# Patient Record
Sex: MALE | Race: BLACK OR AFRICAN AMERICAN | Employment: FULL TIME | URBAN - METROPOLITAN AREA
[De-identification: names, ages, dates, MRNs, and addresses within clinical notes are randomized per-mention and may not be internally consistent; named-entity substitution may affect disease eponyms.]

---

## 2017-03-28 ENCOUNTER — HOSPITAL ENCOUNTER (EMERGENCY)
Age: 37
Discharge: HOME | End: 2017-03-28
Attending: EMERGENCY MEDICINE
Payer: COMMERCIAL

## 2017-03-28 ENCOUNTER — APPOINTMENT (OUTPATIENT)
Dept: GENERAL RADIOLOGY | Age: 37
End: 2017-03-28
Payer: COMMERCIAL

## 2017-03-28 VITALS
OXYGEN SATURATION: 100 % | HEIGHT: 75 IN | RESPIRATION RATE: 14 BRPM | SYSTOLIC BLOOD PRESSURE: 188 MMHG | WEIGHT: 273.8 LBS | BODY MASS INDEX: 34.04 KG/M2 | DIASTOLIC BLOOD PRESSURE: 106 MMHG | HEART RATE: 90 BPM | TEMPERATURE: 97.8 F

## 2017-03-28 DIAGNOSIS — S93.401D SPRAIN OF RIGHT ANKLE, UNSPECIFIED LIGAMENT, SUBSEQUENT ENCOUNTER: Primary | ICD-10-CM

## 2017-03-28 PROCEDURE — 73630 X-RAY EXAM OF FOOT: CPT

## 2017-03-28 PROCEDURE — 73610 X-RAY EXAM OF ANKLE: CPT

## 2017-03-28 PROCEDURE — 99283 EMERGENCY DEPT VISIT LOW MDM: CPT

## 2017-03-28 RX ORDER — IBUPROFEN 800 MG/1
800 TABLET ORAL EVERY 8 HOURS PRN
Qty: 30 TABLET | Refills: 0 | Status: SHIPPED | OUTPATIENT
Start: 2017-03-28

## 2017-03-28 RX ORDER — HYDROCODONE BITARTRATE AND ACETAMINOPHEN 5; 325 MG/1; MG/1
1 TABLET ORAL 3 TIMES DAILY
Qty: 15 TABLET | Refills: 0 | Status: SHIPPED | OUTPATIENT
Start: 2017-03-28 | End: 2017-04-02

## 2017-03-28 ASSESSMENT — PAIN DESCRIPTION - LOCATION: LOCATION: ANKLE

## 2017-03-28 ASSESSMENT — PAIN SCALES - GENERAL: PAINLEVEL_OUTOF10: 5

## 2017-03-28 ASSESSMENT — PAIN DESCRIPTION - PAIN TYPE: TYPE: CHRONIC PAIN

## 2017-09-29 ENCOUNTER — HOSPITAL ENCOUNTER (EMERGENCY)
Age: 37
Discharge: HOME | End: 2017-09-29
Attending: EMERGENCY MEDICINE
Payer: COMMERCIAL

## 2017-09-29 VITALS
RESPIRATION RATE: 16 BRPM | BODY MASS INDEX: 34.82 KG/M2 | WEIGHT: 280 LBS | HEART RATE: 86 BPM | HEIGHT: 75 IN | TEMPERATURE: 98 F | OXYGEN SATURATION: 98 % | SYSTOLIC BLOOD PRESSURE: 182 MMHG | DIASTOLIC BLOOD PRESSURE: 94 MMHG

## 2017-09-29 DIAGNOSIS — R07.9 CHEST PAIN, UNSPECIFIED TYPE: Primary | ICD-10-CM

## 2017-09-29 DIAGNOSIS — I10 ESSENTIAL HYPERTENSION: ICD-10-CM

## 2017-09-29 LAB
ABSOLUTE EOS #: 0.1 K/UL (ref 0–0.4)
ABSOLUTE LYMPH #: 2.4 K/UL (ref 1–4.8)
ABSOLUTE MONO #: 0.6 K/UL (ref 0.2–0.8)
ANION GAP SERPL CALCULATED.3IONS-SCNC: 16 MMOL/L (ref 9–17)
BASOPHILS # BLD: 0 %
BASOPHILS ABSOLUTE: 0 K/UL (ref 0–0.2)
BUN BLDV-MCNC: 14 MG/DL (ref 6–20)
BUN/CREAT BLD: 10 (ref 9–20)
CALCIUM SERPL-MCNC: 9.4 MG/DL (ref 8.6–10.4)
CHLORIDE BLD-SCNC: 96 MMOL/L (ref 98–107)
CO2: 24 MMOL/L (ref 20–31)
CREAT SERPL-MCNC: 1.4 MG/DL (ref 0.7–1.2)
DIFFERENTIAL TYPE: ABNORMAL
EOSINOPHILS RELATIVE PERCENT: 1 %
GFR AFRICAN AMERICAN: >60 ML/MIN
GFR NON-AFRICAN AMERICAN: 56 ML/MIN
GFR SERPL CREATININE-BSD FRML MDRD: ABNORMAL ML/MIN/{1.73_M2}
GFR SERPL CREATININE-BSD FRML MDRD: ABNORMAL ML/MIN/{1.73_M2}
GLUCOSE BLD-MCNC: 236 MG/DL (ref 75–110)
GLUCOSE BLD-MCNC: 255 MG/DL (ref 70–99)
HCT VFR BLD CALC: 43.3 % (ref 41–53)
HEMOGLOBIN: 14.2 G/DL (ref 13.5–17.5)
LYMPHOCYTES # BLD: 19 %
MCH RBC QN AUTO: 26.8 PG (ref 26–34)
MCHC RBC AUTO-ENTMCNC: 32.8 G/DL (ref 31–37)
MCV RBC AUTO: 81.9 FL (ref 80–100)
MONOCYTES # BLD: 5 %
PDW BLD-RTO: 16.2 % (ref 11.5–14.5)
PLATELET # BLD: 241 K/UL (ref 130–400)
PLATELET ESTIMATE: ABNORMAL
PMV BLD AUTO: 8.9 FL (ref 6–12)
POTASSIUM SERPL-SCNC: 4.2 MMOL/L (ref 3.7–5.3)
RBC # BLD: 5.28 M/UL (ref 4.5–5.9)
RBC # BLD: ABNORMAL 10*6/UL
SEG NEUTROPHILS: 75 %
SEGMENTED NEUTROPHILS ABSOLUTE COUNT: 9.7 K/UL (ref 1.8–7.7)
SODIUM BLD-SCNC: 136 MMOL/L (ref 135–144)
WBC # BLD: 12.9 K/UL (ref 3.5–11)
WBC # BLD: ABNORMAL 10*3/UL

## 2017-09-29 PROCEDURE — 96375 TX/PRO/DX INJ NEW DRUG ADDON: CPT

## 2017-09-29 PROCEDURE — 96374 THER/PROPH/DIAG INJ IV PUSH: CPT

## 2017-09-29 PROCEDURE — 93005 ELECTROCARDIOGRAM TRACING: CPT

## 2017-09-29 PROCEDURE — 36415 COLL VENOUS BLD VENIPUNCTURE: CPT

## 2017-09-29 PROCEDURE — 6360000002 HC RX W HCPCS: Performed by: NURSE PRACTITIONER

## 2017-09-29 PROCEDURE — 99283 EMERGENCY DEPT VISIT LOW MDM: CPT

## 2017-09-29 PROCEDURE — 80048 BASIC METABOLIC PNL TOTAL CA: CPT

## 2017-09-29 PROCEDURE — 96376 TX/PRO/DX INJ SAME DRUG ADON: CPT

## 2017-09-29 PROCEDURE — 82947 ASSAY GLUCOSE BLOOD QUANT: CPT

## 2017-09-29 PROCEDURE — 85025 COMPLETE CBC W/AUTO DIFF WBC: CPT

## 2017-09-29 PROCEDURE — 2500000003 HC RX 250 WO HCPCS: Performed by: NURSE PRACTITIONER

## 2017-09-29 RX ORDER — HYDRALAZINE HYDROCHLORIDE 20 MG/ML
10 INJECTION INTRAMUSCULAR; INTRAVENOUS ONCE
Status: COMPLETED | OUTPATIENT
Start: 2017-09-29 | End: 2017-09-29

## 2017-09-29 RX ORDER — LABETALOL HYDROCHLORIDE 5 MG/ML
10 INJECTION, SOLUTION INTRAVENOUS ONCE
Status: DISCONTINUED | OUTPATIENT
Start: 2017-09-29 | End: 2017-09-29

## 2017-09-29 RX ORDER — AMLODIPINE BESYLATE 10 MG/1
10 TABLET ORAL DAILY
COMMUNITY

## 2017-09-29 RX ORDER — AMLODIPINE BESYLATE 10 MG/1
10 TABLET ORAL DAILY
Qty: 14 TABLET | Refills: 0 | Status: SHIPPED | OUTPATIENT
Start: 2017-09-29

## 2017-09-29 RX ORDER — LISINOPRIL AND HYDROCHLOROTHIAZIDE 20; 12.5 MG/1; MG/1
1 TABLET ORAL 2 TIMES DAILY
Qty: 28 TABLET | Refills: 0 | Status: SHIPPED | OUTPATIENT
Start: 2017-09-29

## 2017-09-29 RX ORDER — METOPROLOL SUCCINATE 25 MG/1
25 TABLET, EXTENDED RELEASE ORAL DAILY
Qty: 14 TABLET | Refills: 0 | Status: SHIPPED | OUTPATIENT
Start: 2017-09-29

## 2017-09-29 RX ORDER — LABETALOL HYDROCHLORIDE 5 MG/ML
20 INJECTION, SOLUTION INTRAVENOUS ONCE
Status: COMPLETED | OUTPATIENT
Start: 2017-09-29 | End: 2017-09-29

## 2017-09-29 RX ADMIN — HYDRALAZINE HYDROCHLORIDE 10 MG: 20 INJECTION INTRAMUSCULAR; INTRAVENOUS at 15:14

## 2017-09-29 RX ADMIN — LABETALOL HYDROCHLORIDE 20 MG: 5 INJECTION, SOLUTION INTRAVENOUS at 17:06

## 2017-09-29 RX ADMIN — HYDRALAZINE HYDROCHLORIDE 10 MG: 20 INJECTION INTRAMUSCULAR; INTRAVENOUS at 16:19

## 2017-09-29 ASSESSMENT — PAIN DESCRIPTION - PAIN TYPE: TYPE: ACUTE PAIN

## 2017-09-29 ASSESSMENT — ENCOUNTER SYMPTOMS
DIARRHEA: 0
COLOR CHANGE: 0
RHINORRHEA: 1
SINUS PRESSURE: 1
SORE THROAT: 0
SHORTNESS OF BREATH: 0
COUGH: 0
VOMITING: 0
ABDOMINAL PAIN: 0
NAUSEA: 0

## 2017-09-29 ASSESSMENT — PAIN SCALES - GENERAL: PAINLEVEL_OUTOF10: 4

## 2017-09-29 ASSESSMENT — PAIN DESCRIPTION - LOCATION: LOCATION: FACE

## 2017-09-29 ASSESSMENT — PAIN DESCRIPTION - DESCRIPTORS: DESCRIPTORS: PRESSURE

## 2017-09-29 NOTE — ED PROVIDER NOTES
Team [address] ED  eMERGENCY dEPARTMENT eNCOUnter      Pt Name: Lois Bernard  MRN: [id number]  [place] 1980  Date of evaluation: 9/29/2017  Provider: Teodoro Downs NP    CHIEF COMPLAINT       Chief Complaint   Patient presents with    Hypertension     sent by (pt has been off of his prescribed medications past several months). .pt given a dose of norvasc at [address]  (Location/Symptom, Timing/Onset, Context/Setting, Quality, Duration, Modifying Factors, Severity.)   Lois Bernard is a 42 y.o. male who presents to the emergency department via private auto for HTN. States he has been out of his medication for about a year. reports he was at an urgent care today to get antibiotics for a sinus infection. He was advised his BP was elevated. He was given 10 mg of norvasc PO without change after one hour. Denies fever, chills, headache, dizziness, vision changes, chest pain, SOB. Rates his pain 4/10 at this time. He was previously on 3 BP medications. Nursing Notes were reviewed. ALLERGIES     Review of patient's allergies indicates no known allergies. CURRENT MEDICATIONS       Previous Medications    AMLODIPINE (NORVASC) 10 MG TABLET    Take 10 mg by mouth daily    IBUPROFEN (ADVIL;MOTRIN) 800 MG TABLET    Take 1 tablet by mouth every 8 hours as needed for Pain    NONFORMULARY    1 tablet daily. Unknown blood pressure medication    ROSUVASTATIN (CRESTOR) 10 MG TABLET    Take 10 mg by mouth daily. SITAGLIPTAN-METFORMIN (JANUMET)  MG PER TABLET    Take 1 tablet by mouth 2 times daily (with meals). PAST MEDICAL HISTORY         Diagnosis Date    Diabetes mellitus ([address]     Hyperlipidemia     Hypertension        SURGICAL HISTORY     History reviewed. No pertinent surgical history. FAMILY HISTORY     History reviewed. No pertinent family history. No family status information on file.         SOCIAL HISTORY      reports that he has never smoked. He has never used smokeless tobacco. He reports that he drinks alcohol. He reports that he does not use illicit drugs. REVIEW OF SYSTEMS    (2-9 systems for level 4, 10 or more for level 5)     Review of Systems   Constitutional: Negative for chills, diaphoresis, fatigue and fever. HENT: Positive for congestion, postnasal drip, rhinorrhea and sinus pressure. Negative for ear discharge, ear pain and sore throat. Respiratory: Negative for cough and shortness of breath. Cardiovascular: Negative for chest pain, palpitations and leg swelling. Gastrointestinal: Negative for abdominal pain, diarrhea, nausea and vomiting. Genitourinary: Negative for dysuria. Musculoskeletal: Negative for arthralgias, myalgias, neck pain and neck stiffness. Skin: Negative for color change and rash. Neurological: Negative for dizziness, weakness, light-headedness and headaches. Except as noted above the remainder of the review of systems was reviewed and negative. PHYSICAL EXAM    (up to 7 for level 4, 8 or more for level 5)   ED Triage Vitals   BP Temp Temp Source Pulse Resp SpO2 Height Weight   09/29/17 1446 09/29/17 1446 09/29/17 1446 09/29/17 1446 09/29/17 1446 09/29/17 1446 09/29/17 1446 09/29/17 1446   254/140 98 °F (36.7 °C) Oral 105 16 99 % 6' 3\" (1.905 m) 280 lb (127 kg)     Physical Exam   Constitutional: He is oriented to person, place, and time. He appears well-developed and well-nourished. No distress. HENT:   Mouth/Throat: Oropharynx is clear and moist.   Eyes: Conjunctivae are normal. Pupils are equal, round, and reactive to light. Cardiovascular: Normal rate, regular rhythm and normal heart sounds. Pulmonary/Chest: Effort normal and breath sounds normal. No respiratory distress. He has no wheezes. He has no rales. Abdominal: Soft. Bowel sounds are normal. There is no tenderness. Musculoskeletal: He exhibits no edema.    Neurological: He is alert and oriented to person, place, and time.   Skin: Skin is warm and dry. No rash noted. He is not diaphoretic. Psychiatric: He has a normal mood and affect. His behavior is normal.   Vitals reviewed. DIAGNOSTIC RESULTS     EKG: All EKG's are interpreted by the Emergency Department Physician who either signs or Co-signs this chart in the absence of a cardiologist.  EKG was interpreted by Dr. Neel Gomez after completion. LABS:  Labs Reviewed   BASIC METABOLIC PANEL - Abnormal; Notable for the following:        Result Value    Glucose 255 (*)     CREATININE 1.40 (*)     Chloride 96 (*)     GFR Non- 56 (*)     All other components within normal limits   CBC WITH AUTO DIFFERENTIAL - Abnormal; Notable for the following:     WBC 12.9 (*)     RDW 16.2 (*)     Segs Absolute 9.70 (*)     All other components within normal limits   POC GLUCOSE FINGERSTICK - Abnormal; Notable for the following:     POC Glucose 236 (*)     All other components within normal limits       All other labs were within normal range or not returned as of this dictation. EMERGENCY DEPARTMENT COURSE and DIFFERENTIAL DIAGNOSIS/MDM:   Vitals:    Vitals:    09/29/17 1619 09/29/17 1657 09/29/17 1722 09/29/17 1736   BP: (!) 230/119 (!) 218/111 (!) 183/96 (!) 182/94   Pulse:  98 87 86   Resp:  18 16 16   Temp:       TempSrc:       SpO2:  97% 97% 98%   Weight:       Height:           MEDICATIONS GIVEN IN THE ED:  Medications   hydrALAZINE (APRESOLINE) injection 10 mg (10 mg Intravenous Given 9/29/17 1514)   hydrALAZINE (APRESOLINE) injection 10 mg (10 mg Intravenous Given 9/29/17 1619)   labetalol (NORMODYNE;TRANDATE) injection 20 mg (20 mg Intravenous Given 9/29/17 1706)       CLINICAL DECISION MAKING:  The patient presented alert with a nontoxic appearance and was seen in conjunction with Dr. Neel Gomez. He was given hydralazine with little improvement. The patient was then given labetalol.  His pharmacy was called ([email or web address] to confirm the prescriptions he was previously on. Prescriptions were written for a two week supply of the 3 medications. Follow up with pcp for further evaluation and treatment, return to ED if condition worsens. FINAL IMPRESSION      1. Chest pain, unspecified type    2.  Essential hypertension          DISPOSITION/PLAN   DISPOSITION Decision to Discharge    PATIENT REFERRED TO:   [place], [address] [facility]  [phone]    Schedule an appointment as soon as possible for a visit in 2 days      [facility] ED  [address] [facility]  [phone]    If symptoms worsen      DISCHARGE MEDICATIONS:     New Prescriptions    AMLODIPINE (NORVASC) 10 MG TABLET    Take 1 tablet by mouth daily    LISINOPRIL-HYDROCHLOROTHIAZIDE (PRINZIDE;ZESTORETIC) 20-12.5 MG PER TABLET    Take 1 tablet by mouth 2 times daily    METOPROLOL SUCCINATE (TOPROL XL) 25 MG EXTENDED RELEASE TABLET    Take 1 tablet by mouth daily           (Please note that portions of this note were completed with a voice recognition program.  Efforts were made to edit the dictations but occasionally words are mis-transcribed.)    REAGAN Pineda NP  09/29/17 2174

## 2017-09-29 NOTE — ED AVS SNAPSHOT
After Visit Summary  (Discharge Instructions)    Medication List for Home    Based on the information you provided to us as well as any changes during this visit, the following is your updated medication list.  Compare this with your prescription bottles at home. If you have any questions or concerns, contact your primary care physician's office. Daily Medication List (This medication list can be shared with any Healthcare provider who is helping you manage your medications)      There are NEW medications for you. START taking them after you leave the hospital     lisinopril-hydrochlorothiazide 20-12.5 MG per tablet   Commonly known as:  PRINZIDE;ZESTORETIC   Take 1 tablet by mouth 2 times daily       metoprolol succinate 25 MG extended release tablet   Commonly known as:  TOPROL XL   Take 1 tablet by mouth daily         You told us you were taking these medications at home, but the amount or how often you take this medication has CHANGED     * amLODIPine 10 MG tablet   Commonly known as:  NORVASC   Take 10 mg by mouth daily   What changed:  Another medication with the same name was added. Make sure you understand how and when to take each. * amLODIPine 10 MG tablet   Commonly known as:  NORVASC   Take 1 tablet by mouth daily   What changed: You were already taking a medication with the same name, and this prescription was added. Make sure you understand how and when to take each. * Notice: This list has 2 medication(s) that are the same as other medications prescribed for you. Read the directions carefully, and ask your doctor or other care provider to review them with you. ASK your doctor about these medications if you have questions     CRESTOR 10 MG tablet   Generic drug:  rosuvastatin   Take 10 mg by mouth daily.        ibuprofen 800 MG tablet   Commonly known as:  ADVIL;MOTRIN   Take 1 tablet by mouth every 8 hours as needed for Pain       JANUMET  MG per tablet Generic drug:  sitaGLIPtan-metformin   Take 1 tablet by mouth 2 times daily (with meals). NONFORMULARY   1 tablet daily. Unknown blood pressure medication            Where to Get Your Medications      You can get these medications from any pharmacy     Bring a paper prescription for each of these medications     amLODIPine 10 MG tablet    lisinopril-hydrochlorothiazide 20-12.5 MG per tablet    metoprolol succinate 25 MG extended release tablet               Allergies as of 2017     No Known Allergies      Immunizations as of 2017     No immunizations on file. After Visit Summary    This summary was created for you. Thank you for entrusting your care to us. The following information includes details about your hospital/visit stay along with steps you should take to help with your recovery once you leave the hospital.  In this packet, you will find information about the topics listed below:    · Instructions about your medications including a list of your home medications  · A summary of your hospital visit  · Follow-up appointments once you have left the hospital  · Your care plan at home      You may receive a survey regarding the care you received during your stay. Your input is valuable to us. We encourage you to complete and return your survey in the envelope provided. We hope you will choose us in the future for your healthcare needs. Patient Information     Patient Name DHRUV Peace 1980      Care Provided at:     Name Address Phone       [place] [address] [phone]            Your Visit    Here you will find information about your visit, including the reason for your visit. Please take this sheet with you when you visit your doctor or other health care provider in the future. It will help determine the best possible medical care for you at that time.   If you have any questions once you leave the hospital, please call the department phone number listed below. Diagnoses this visit     Your diagnoses were CHEST PAIN, UNSPECIFIED TYPE and ESSENTIAL HYPERTENSION. Visit Information     Date of Visit Department Dept Phone    9/29/2017 [facility] [phone]      You were seen by     You were seen by Chelsea Rodriguez MD and Guy Dewey NP. Follow-up Appointments    Below is a list of your follow-up and future appointments. This may not be a complete list as you may have made appointments directly with providers that we are not aware of or your providers may have made some for you. Please call your providers to confirm appointments. It is important to keep your appointments. Please bring your current insurance card, photo ID, co-pay, and all medication bottles to your appointment. If self-pay, payment is expected at the time of service. Follow-up Information     Follow up with Thea James MD. Schedule an appointment as soon as possible for a visit in 2 days. Specialty:  Family Medicine    Contact information:    [address] [facility]  [phone]          Follow up with [facility] ED. Specialty:  Emergency Medicine    Why:  If symptoms worsen    Contact information:    [address] [facility]  [phone]      Preventive Care        Date Due    Diabetic Foot Exam 8/31/1988    Eye Exam By An Eye Doctor 8/31/1988    HIV screening is recommended for all people regardless of risk factors  aged 15-65 years at least once (lifetime) who have never been HIV tested.  8/31/1993    Tetanus Combination Vaccine (1 - Tdap) 8/31/1997    Pneumococcal Vaccine - Pneumovax for adults aged 19-64 years with: chronic heart disease, chronic lung disease, diabetes mellitus, alcoholism, chronic liver disease, or cigarette smoking. (1 of 1 - PPSV23) 8/31/1997    Urine Check For Kidney Problems 2/7/2013 Hemoglobin A1C (Test For Long-Term Glucose Control) 4/28/2015    Cholesterol Screening 4/28/2015    Yearly Flu Vaccine (1) 9/1/2017                 Care Plan Once You Return Home    This section includes instructions you will need to follow once you leave the hospital.  Your care team will discuss these with you, so you and those caring for you know how to best care for your health needs at home. This section may also include educational information about certain health topics that may be of help to you. Important Information if you smoke or are exposed to smoking       SMOKING: QUIT SMOKING. THIS IS THE MOST IMPORTANT ACTION YOU CAN TAKE TO IMPROVE YOUR CURRENT AND FUTURE HEALTH. Call the [address] [place] [address] [employer] at [place] NOW ([phone])    Smoking harms nonsmokers. When nonsmokers are around people who smoke, they absorb nicotine, carbon monoxide, and other ingredients of tobacco smoke. DO NOT SMOKE AROUND CHILDREN     Children exposed to secondhand smoke are at an increased risk of:  Sudden Infant Death Syndrome (SIDS), acute respiratory infections, inflammation of the middle ear, and severe asthma. Over a longer time, it causes heart disease and lung cancer. There is no safe level of exposure to secondhand smoke. [employer] Signup     [employer] allows you to send messages to your doctor, view your test results, renew your prescriptions, schedule appointments, view visit notes, and more. How Do I Sign Up? 1. In your Internet browser, go to [email or web address]  2. Click on the Sign Up Now link in the Sign In box. You will see the New Member Sign Up page. 3. Enter your [employer] Access Code exactly as it appears below. You will not need to use this code after youve completed the sign-up process. If you do not sign up before the expiration date, you must request a new code.   [employer] Access Code: [email or web address] has been given to me, the patient and/or responsible adult. Patient Signature/Responsible Adult: ___________________________________    Nurse Signature: ___________________________________  Resident/MLP Signature: ___________________________________  Attending Signature: ___________________________________    Date:____________Time:____________              Discharge Instructions            High Blood Pressure: Care Instructions  Your Care Instructions  If your blood pressure is usually above 140/90, you have high blood pressure, or hypertension. That means the top number is 140 or higher or the bottom number is 90 or higher, or both. Despite what a lot of people think, high blood pressure usually doesn't cause headaches or make you feel dizzy or lightheaded. It usually has no symptoms. But it does increase your risk for heart attack, stroke, and kidney or eye damage. The higher your blood pressure, the more your risk increases. Your doctor will give you a goal for your blood pressure. Your goal will be based on your health and your age. An example of a goal is to keep your blood pressure below 140/90. Lifestyle changes, such as eating healthy and being active, are always important to help lower blood pressure. You might also take medicine to reach your blood pressure goal.  Follow-up care is a key part of your treatment and safety. Be sure to make and go to all appointments, and call your doctor if you are having problems. It's also a good idea to know your test results and keep a list of the medicines you take. How can you care for yourself at home? Medical treatment  · If you stop taking your medicine, your blood pressure will go back up. You may take one or more types of medicine to lower your blood pressure. Be safe with medicines. Take your medicine exactly as prescribed. Call your doctor if you think you are having a problem with your medicine. · Talk to your doctor before you start taking aspirin every day. Aspirin can help certain people lower their risk of a heart attack or stroke. But taking aspirin isn't right for everyone, because it can cause serious bleeding. · See your doctor regularly. You may need to see the doctor more often at first or until your blood pressure comes down. · If you are taking blood pressure medicine, talk to your doctor before you take decongestants or anti-inflammatory medicine, such as ibuprofen. Some of these medicines can raise blood pressure. · Learn how to check your blood pressure at home. Lifestyle changes  · Stay at a healthy weight. This is especially important if you put on weight around the waist. Losing even 10 pounds can help you lower your blood pressure. · If your doctor recommends it, get more exercise. Walking is a good choice. Bit by bit, increase the amount you walk every day. Try for at least 30 minutes on most days of the week. You also may want to swim, bike, or do other activities. · Avoid or limit alcohol. Talk to your doctor about whether you can drink any alcohol. · Try to limit how much sodium you eat to less than 2,300 milligrams (mg) a day. Your doctor may ask you to try to eat less than 1,500 mg a day. · Eat plenty of fruits (such as bananas and oranges), vegetables, legumes, whole grains, and low-fat dairy products. · Lower the amount of saturated fat in your diet. Saturated fat is found in animal products such as milk, cheese, and meat. Limiting these foods may help you lose weight and also lower your risk for heart disease. · Do not smoke. Smoking increases your risk for heart attack and stroke. If you need help quitting, talk to your doctor about stop-smoking programs and medicines. These can increase your chances of quitting for good. When should you call for help? Call 911 anytime you think you may need emergency care.  This may mean

## 2017-09-30 LAB
EKG ATRIAL RATE: 102 BPM
EKG ATRIAL RATE: 97 BPM
EKG P AXIS: 62 DEGREES
EKG P AXIS: 69 DEGREES
EKG P-R INTERVAL: 158 MS
EKG P-R INTERVAL: 158 MS
EKG Q-T INTERVAL: 390 MS
EKG Q-T INTERVAL: 416 MS
EKG QRS DURATION: 140 MS
EKG QRS DURATION: 144 MS
EKG QTC CALCULATION (BAZETT): 508 MS
EKG QTC CALCULATION (BAZETT): 528 MS
EKG R AXIS: 53 DEGREES
EKG R AXIS: 66 DEGREES
EKG T AXIS: 29 DEGREES
EKG T AXIS: 39 DEGREES
EKG VENTRICULAR RATE: 102 BPM
EKG VENTRICULAR RATE: 97 BPM

## 2017-10-27 ENCOUNTER — HOSPITAL ENCOUNTER (OUTPATIENT)
Age: 37
Setting detail: SPECIMEN
Discharge: HOME | End: 2017-10-27
Payer: COMMERCIAL

## 2017-10-27 LAB
ABSOLUTE EOS #: 0.2 K/UL (ref 0–0.4)
ABSOLUTE IMMATURE GRANULOCYTE: NORMAL K/UL (ref 0–0.3)
ABSOLUTE LYMPH #: 2.5 K/UL (ref 1–4.8)
ABSOLUTE MONO #: 0.5 K/UL (ref 0.1–1.2)
ALBUMIN SERPL-MCNC: 4.1 G/DL (ref 3.5–5.2)
ALBUMIN/GLOBULIN RATIO: 1.1 (ref 1–2.5)
ALP BLD-CCNC: 122 U/L (ref 40–129)
ALT SERPL-CCNC: 19 U/L (ref 5–41)
ANION GAP SERPL CALCULATED.3IONS-SCNC: 16 MMOL/L (ref 9–17)
AST SERPL-CCNC: 37 U/L
BASOPHILS # BLD: 0 %
BASOPHILS ABSOLUTE: 0 K/UL (ref 0–0.2)
BILIRUB SERPL-MCNC: 0.27 MG/DL (ref 0.3–1.2)
BILIRUBIN URINE: NEGATIVE
BUN BLDV-MCNC: 16 MG/DL (ref 6–20)
BUN/CREAT BLD: ABNORMAL (ref 9–20)
CALCIUM SERPL-MCNC: 9 MG/DL (ref 8.6–10.4)
CHLORIDE BLD-SCNC: 94 MMOL/L (ref 98–107)
CHOLESTEROL/HDL RATIO: 9.8
CHOLESTEROL: 332 MG/DL
CO2: 22 MMOL/L (ref 20–31)
COLOR: YELLOW
COMMENT UA: ABNORMAL
CREAT SERPL-MCNC: 1.04 MG/DL (ref 0.7–1.2)
DIFFERENTIAL TYPE: NORMAL
EOSINOPHILS RELATIVE PERCENT: 2 %
ESTIMATED AVERAGE GLUCOSE: 292 MG/DL
GFR AFRICAN AMERICAN: >60 ML/MIN
GFR NON-AFRICAN AMERICAN: >60 ML/MIN
GFR SERPL CREATININE-BSD FRML MDRD: ABNORMAL ML/MIN/{1.73_M2}
GFR SERPL CREATININE-BSD FRML MDRD: ABNORMAL ML/MIN/{1.73_M2}
GLUCOSE BLD-MCNC: 213 MG/DL (ref 70–99)
GLUCOSE URINE: ABNORMAL
HBA1C MFR BLD: 11.8 % (ref 4–6)
HCT VFR BLD CALC: 43 % (ref 41–53)
HDLC SERPL-MCNC: 34 MG/DL
HEMOGLOBIN: 14.1 G/DL (ref 13.5–17.5)
IMMATURE GRANULOCYTES: NORMAL %
KETONES, URINE: NEGATIVE
LDL CHOLESTEROL: 232 MG/DL (ref 0–130)
LEUKOCYTE ESTERASE, URINE: NEGATIVE
LYMPHOCYTES # BLD: 31 %
MCH RBC QN AUTO: 26.8 PG (ref 26–34)
MCHC RBC AUTO-ENTMCNC: 32.9 G/DL (ref 31–37)
MCV RBC AUTO: 81.4 FL (ref 80–100)
MONOCYTES # BLD: 7 %
NITRITE, URINE: NEGATIVE
PDW BLD-RTO: 15.3 % (ref 12.5–15.4)
PH UA: 5 (ref 5–8)
PLATELET # BLD: 238 K/UL (ref 140–450)
PLATELET ESTIMATE: NORMAL
PMV BLD AUTO: 9.5 FL (ref 6–12)
POTASSIUM SERPL-SCNC: 4.7 MMOL/L (ref 3.7–5.3)
PROSTATE SPECIFIC ANTIGEN: 0.52 UG/L
PROTEIN UA: ABNORMAL
RBC # BLD: 5.28 M/UL (ref 4.5–5.9)
RBC # BLD: NORMAL 10*6/UL
SEG NEUTROPHILS: 60 %
SEGMENTED NEUTROPHILS ABSOLUTE COUNT: 4.8 K/UL (ref 1.8–7.7)
SODIUM BLD-SCNC: 132 MMOL/L (ref 135–144)
SPECIFIC GRAVITY UA: 1.03 (ref 1–1.03)
TOTAL PROTEIN: 7.8 G/DL (ref 6.4–8.3)
TRIGL SERPL-MCNC: 330 MG/DL
TSH SERPL DL<=0.05 MIU/L-ACNC: 1.58 MIU/L (ref 0.3–5)
TURBIDITY: CLEAR
URINE HGB: NEGATIVE
UROBILINOGEN, URINE: NORMAL
VITAMIN D 25-HYDROXY: 26 NG/ML (ref 30–100)
VLDLC SERPL CALC-MCNC: ABNORMAL MG/DL (ref 1–30)
WBC # BLD: 8.1 K/UL (ref 3.5–11)
WBC # BLD: NORMAL 10*3/UL

## 2022-09-23 ENCOUNTER — APPOINTMENT (OUTPATIENT)
Dept: GENERAL RADIOLOGY | Age: 42
End: 2022-09-23
Payer: COMMERCIAL

## 2022-09-23 ENCOUNTER — HOSPITAL ENCOUNTER (EMERGENCY)
Age: 42
Discharge: HOME | End: 2022-09-23
Attending: EMERGENCY MEDICINE
Payer: COMMERCIAL

## 2022-09-23 VITALS
DIASTOLIC BLOOD PRESSURE: 80 MMHG | HEART RATE: 97 BPM | WEIGHT: 260 LBS | SYSTOLIC BLOOD PRESSURE: 130 MMHG | TEMPERATURE: 98.1 F | HEIGHT: 75 IN | BODY MASS INDEX: 32.33 KG/M2 | OXYGEN SATURATION: 97 % | RESPIRATION RATE: 18 BRPM

## 2022-09-23 DIAGNOSIS — S91.312A LACERATION OF LEFT FOOT, INITIAL ENCOUNTER: Primary | ICD-10-CM

## 2022-09-23 PROCEDURE — 6370000000 HC RX 637 (ALT 250 FOR IP): Performed by: EMERGENCY MEDICINE

## 2022-09-23 PROCEDURE — 73630 X-RAY EXAM OF FOOT: CPT

## 2022-09-23 PROCEDURE — 6360000002 HC RX W HCPCS: Performed by: EMERGENCY MEDICINE

## 2022-09-23 PROCEDURE — 99284 EMERGENCY DEPT VISIT MOD MDM: CPT

## 2022-09-23 PROCEDURE — 90471 IMMUNIZATION ADMIN: CPT | Performed by: EMERGENCY MEDICINE

## 2022-09-23 PROCEDURE — 90715 TDAP VACCINE 7 YRS/> IM: CPT | Performed by: EMERGENCY MEDICINE

## 2022-09-23 PROCEDURE — 12002 RPR S/N/AX/GEN/TRNK2.6-7.5CM: CPT

## 2022-09-23 RX ORDER — CEPHALEXIN 250 MG/1
500 CAPSULE ORAL ONCE
Status: COMPLETED | OUTPATIENT
Start: 2022-09-23 | End: 2022-09-23

## 2022-09-23 RX ORDER — TRAMADOL HYDROCHLORIDE 50 MG/1
100 TABLET ORAL EVERY 6 HOURS PRN
Qty: 12 TABLET | Refills: 0 | Status: SHIPPED | OUTPATIENT
Start: 2022-09-23 | End: 2022-09-26

## 2022-09-23 RX ORDER — HYDROCODONE BITARTRATE AND ACETAMINOPHEN 5; 325 MG/1; MG/1
1 TABLET ORAL ONCE
Status: COMPLETED | OUTPATIENT
Start: 2022-09-23 | End: 2022-09-23

## 2022-09-23 RX ORDER — CEPHALEXIN 500 MG/1
500 CAPSULE ORAL 4 TIMES DAILY
Qty: 28 CAPSULE | Refills: 0 | Status: SHIPPED | OUTPATIENT
Start: 2022-09-23 | End: 2022-09-30

## 2022-09-23 RX ORDER — LISINOPRIL 5 MG/1
5 TABLET ORAL DAILY
COMMUNITY

## 2022-09-23 RX ORDER — HYDRALAZINE HYDROCHLORIDE 100 MG/1
100 TABLET, FILM COATED ORAL 2 TIMES DAILY
COMMUNITY

## 2022-09-23 RX ADMIN — Medication 3 ML: at 09:55

## 2022-09-23 RX ADMIN — TETANUS TOXOID, REDUCED DIPHTHERIA TOXOID AND ACELLULAR PERTUSSIS VACCINE, ADSORBED 0.5 ML: 5; 2.5; 8; 8; 2.5 SUSPENSION INTRAMUSCULAR at 09:45

## 2022-09-23 RX ADMIN — HYDROCODONE BITARTRATE AND ACETAMINOPHEN 1 TABLET: 5; 325 TABLET ORAL at 10:41

## 2022-09-23 RX ADMIN — CEPHALEXIN 500 MG: 250 CAPSULE ORAL at 09:43

## 2022-09-23 RX ADMIN — HYDROCODONE BITARTRATE AND ACETAMINOPHEN 1 TABLET: 5; 325 TABLET ORAL at 09:43

## 2022-09-23 ASSESSMENT — PAIN DESCRIPTION - ORIENTATION
ORIENTATION: LEFT
ORIENTATION: LEFT

## 2022-09-23 ASSESSMENT — PAIN - FUNCTIONAL ASSESSMENT: PAIN_FUNCTIONAL_ASSESSMENT: 0-10

## 2022-09-23 ASSESSMENT — PAIN SCALES - GENERAL
PAINLEVEL_OUTOF10: 9
PAINLEVEL_OUTOF10: 9
PAINLEVEL_OUTOF10: 8

## 2022-09-23 ASSESSMENT — PAIN DESCRIPTION - LOCATION
LOCATION: FOOT
LOCATION: FOOT

## 2022-09-23 ASSESSMENT — PAIN DESCRIPTION - DESCRIPTORS
DESCRIPTORS: THROBBING
DESCRIPTORS: THROBBING

## 2022-09-23 NOTE — ED PROVIDER NOTES
5 MG tablet Take 5 mg by mouth dailyHistorical Med      !! amLODIPine (NORVASC) 10 MG tablet Take 10 mg by mouth dailyHistorical Med      metoprolol succinate (TOPROL XL) 25 MG extended release tablet Take 1 tablet by mouth daily, Disp-14 tablet, R-0Print      !! amLODIPine (NORVASC) 10 MG tablet Take 1 tablet by mouth daily, Disp-14 tablet, R-0Print      lisinopril-hydrochlorothiazide (PRINZIDE;ZESTORETIC) 20-12.5 MG per tablet Take 1 tablet by mouth 2 times daily, Disp-28 tablet, R-0Print      ibuprofen (ADVIL;MOTRIN) 800 MG tablet Take 1 tablet by mouth every 8 hours as needed for Pain, Disp-30 tablet, R-0Print      sitaGLIPtan-metFORMIN (JANUMET)  MG per tablet Take 1 tablet by mouth 2 times daily (with meals). Historical Med      rosuvastatin (CRESTOR) 10 MG tablet Take 10 mg by mouth daily. !! - Potential duplicate medications found. Please discuss with provider. ALLERGIES     has No Known Allergies. FAMILY HISTORY     has no family status information on file. family history is not on file. SOCIAL HISTORY      reports that he has never smoked. He has never used smokeless tobacco. He reports current alcohol use. He reports that he does not use drugs. PHYSICAL EXAM    (up to 7 for level 4, 8 or more for level 5)   INITIAL VITALS:  height is 6' 3\" (1.905 m) and weight is 117.9 kg (260 lb). His oral temperature is 98.1 °F (36.7 °C). His blood pressure is 130/80 and his pulse is 97. His respiration is 18 and oxygen saturation is 97%. Physical Exam  Vitals and nursing note reviewed. Constitutional:       Appearance: Normal appearance. Musculoskeletal:         General: Tenderness and signs of injury present. Normal range of motion. Skin:     General: Skin is warm and dry. Capillary Refill: Capillary refill takes less than 2 seconds.       Comments: 2.5 cm laceration to the medial aspect of the left foot as depicted below   Neurological:      Mental Status: He is alert and oriented to person, place, and time. Mental status is at baseline. Psychiatric:         Mood and Affect: Mood normal.         Behavior: Behavior normal.         Thought Content: Thought content normal.           DIFFERENTIAL DIAGNOSIS/ MDM:     68-year-old male here with a laceration on his right medial foot from a rock. X-ray shows no broken bones or foreign body. Laceration repaired by WILLIAM Garcia. Tetanus updated. Patient provided with antibiotics as this is a higher risk wound as he is diabetic. DIAGNOSTIC RESULTS     EKG: All EKG's are interpreted by the Emergency Department Physician who either signs or Co-signs this chart in the absence of a cardiologist.    None    RADIOLOGY:        Interpretation per the Radiologist below, if available at the time of this note:    XR FOOT LEFT (MIN 3 VIEWS)    Result Date: 9/23/2022  EXAMINATION: THREE XRAY VIEWS OF THE LEFT FOOT 9/23/2022 9:39 am COMPARISON: None. HISTORY: ORDERING SYSTEM PROVIDED HISTORY: foot pain, struck by a rock, r/o foreign body TECHNOLOGIST PROVIDED HISTORY: foot pain, struck by a rock, r/o foreign body Reason for Exam: foot pain, struck by a rock, r/o foreign body- medial laceration mid left foot FINDINGS: Bandage material is noted about the medial aspect of the hindfoot. No foreign body identified. No acute osseous abnormality appreciated. Soft tissue injury about the medial aspect of the hindfoot without evidence for retained foreign body or fracture. LABS:  No results found for this visit on 09/23/22. None    EMERGENCY DEPARTMENT COURSE:   Vitals:    Vitals:    09/23/22 0936   BP: 130/80   Pulse: 97   Resp: 18   Temp: 98.1 °F (36.7 °C)   TempSrc: Oral   SpO2: 97%   Weight: 117.9 kg (260 lb)   Height: 6' 3\" (1.905 m)     -------------------------  BP: 130/80, Temp: 98.1 °F (36.7 °C), Heart Rate: 97, Resp: 18        CONSULTS:  None    PROCEDURES:  None    FINAL IMPRESSION      1.  Laceration of left foot, initial encounter          DISPOSITION/PLAN   DISPOSITION Decision To Discharge 09/23/2022 11:53:05 AM      CONDITION ON DISPOSITION:   Stable     PATIENT REFERRED TO:  [place], [address] [place] [id number] [phone]    Schedule an appointment as soon as possible for a visit in 3 days      DISCHARGE MEDICATIONS:  Discharge Medication List as of 9/23/2022 11:56 AM        START taking these medications    Details   cephALEXin (KEFLEX) 500 MG capsule Take 1 capsule by mouth 4 times daily for 7 days, Disp-28 capsule, R-0Normal             (Please note that portions of this note were completed with a voicerecognition program.  Efforts were made to edit the dictations but occasionally words are mis-transcribed.)    Maria L Bynum MD  Attending Emergency Medicine Physician        Maria L Bynum MD  09/23/22 3450

## 2022-09-23 NOTE — ED TRIAGE NOTES
Pt was at work and a large rock flung up and hit him in the left foot causing a laceration on top of foot.  Bleeding controled at this time

## 2022-09-23 NOTE — ED PROVIDER NOTES
I was asked to complete the suture repair of this laceration injury, all of the primary evaluation/physical exam/charting/MDM and disposition per attending note. Laceration Repair Procedure Note    Indication: Laceration    Location:   left medial/proximal foot    Procedure: The patient was placed in the appropriate position and anesthesia around the laceration was obtained by infiltration using LET gel. The area around wound(s) was then cleansed with betadine, draped in a sterile fashion and irrigated copiously with normal saline/betadine dilution. The wound(s) were explored well with no foreign bodies or tendon rupture/injury identified. The laceration was closed with 4-0 Prolene using vertical mattress sutures x 6 and a single interrupted suture. There were no additional lacerations requiring repair. The wound area was then dressed with bacitracin and a sterile dressing. Total repaired wound length: 4 cm. Count: Suture count: 7 (vertical mattress x 6, interrupted x 1)    The patient tolerated the procedure well. Patient has good capillary refill. No signs of cyanosis or color change. Some venous ooze appreciated following repair, will dress accordingly. I explained to pt that he will have some drainage/ooze throughout today. I appreciated no retained FB during exploration and copious irrigation. No tendinous  exposures or motor deficits on my exam.  Moving all toes/foot/ankle w/o sensory or motor deficit. Complications: None      [id number]  Pt called and asked for pain meds as foot very painful now at home and resting  Ultram rx provided. Controlled Substance Monitoring:    Acute and Chronic Pain Monitoring:   RX Monitoring 9/23/2022   Periodic Controlled Substance Monitoring No signs of potential drug abuse or diversion identified.             Марина Sun PA-C  09/23/22 [facility]  09/23/22 6220

## 2022-09-29 ENCOUNTER — HOSPITAL ENCOUNTER (OUTPATIENT)
Dept: WOUND CARE | Age: 42
Discharge: HOME | End: 2022-09-29
Payer: COMMERCIAL

## 2022-09-29 VITALS
BODY MASS INDEX: 32.33 KG/M2 | DIASTOLIC BLOOD PRESSURE: 96 MMHG | WEIGHT: 260 LBS | SYSTOLIC BLOOD PRESSURE: 151 MMHG | TEMPERATURE: 97.1 F | RESPIRATION RATE: 18 BRPM | HEIGHT: 75 IN | HEART RATE: 104 BPM

## 2022-09-29 DIAGNOSIS — S90.32XA CONTUSION OF LEFT FOOT, INITIAL ENCOUNTER: ICD-10-CM

## 2022-09-29 DIAGNOSIS — L03.116 CELLULITIS OF LEFT FOOT: ICD-10-CM

## 2022-09-29 DIAGNOSIS — S91.312A LACERATION OF LEFT FOOT, INITIAL ENCOUNTER: Primary | ICD-10-CM

## 2022-09-29 PROCEDURE — 99203 OFFICE O/P NEW LOW 30 MIN: CPT

## 2022-09-29 RX ORDER — LIDOCAINE 50 MG/G
OINTMENT TOPICAL ONCE
Status: CANCELLED | OUTPATIENT
Start: 2022-09-29 | End: 2022-09-29

## 2022-09-29 RX ORDER — BETAMETHASONE DIPROPIONATE 0.05 %
OINTMENT (GRAM) TOPICAL ONCE
Status: CANCELLED | OUTPATIENT
Start: 2022-09-29 | End: 2022-09-29

## 2022-09-29 RX ORDER — INSULIN GLARGINE 100 [IU]/ML
INJECTION, SOLUTION SUBCUTANEOUS
COMMUNITY

## 2022-09-29 RX ORDER — LIDOCAINE HYDROCHLORIDE 40 MG/ML
SOLUTION TOPICAL ONCE
Status: COMPLETED | OUTPATIENT
Start: 2022-09-29 | End: 2022-09-29

## 2022-09-29 RX ORDER — CLOBETASOL PROPIONATE 0.5 MG/G
OINTMENT TOPICAL ONCE
Status: CANCELLED | OUTPATIENT
Start: 2022-09-29 | End: 2022-09-29

## 2022-09-29 RX ORDER — LIDOCAINE HYDROCHLORIDE 20 MG/ML
JELLY TOPICAL ONCE
Status: CANCELLED | OUTPATIENT
Start: 2022-09-29 | End: 2022-09-29

## 2022-09-29 RX ORDER — LIDOCAINE 40 MG/G
CREAM TOPICAL ONCE
Status: CANCELLED | OUTPATIENT
Start: 2022-09-29 | End: 2022-09-29

## 2022-09-29 RX ORDER — GINSENG 100 MG
CAPSULE ORAL ONCE
Status: CANCELLED | OUTPATIENT
Start: 2022-09-29 | End: 2022-09-29

## 2022-09-29 RX ORDER — AMOXICILLIN AND CLAVULANATE POTASSIUM 875; 125 MG/1; MG/1
TABLET, FILM COATED ORAL
COMMUNITY
Start: 2022-09-28

## 2022-09-29 RX ORDER — HYDROCHLOROTHIAZIDE 12.5 MG/1
TABLET ORAL
COMMUNITY

## 2022-09-29 RX ORDER — BACITRACIN ZINC AND POLYMYXIN B SULFATE 500; 1000 [USP'U]/G; [USP'U]/G
OINTMENT TOPICAL ONCE
Status: CANCELLED | OUTPATIENT
Start: 2022-09-29 | End: 2022-09-29

## 2022-09-29 RX ORDER — NIFEDIPINE 90 MG/1
TABLET, EXTENDED RELEASE ORAL
COMMUNITY

## 2022-09-29 RX ORDER — GENTAMICIN SULFATE 1 MG/G
OINTMENT TOPICAL ONCE
Status: CANCELLED | OUTPATIENT
Start: 2022-09-29 | End: 2022-09-29

## 2022-09-29 RX ORDER — BACITRACIN, NEOMYCIN, POLYMYXIN B 400; 3.5; 5 [USP'U]/G; MG/G; [USP'U]/G
OINTMENT TOPICAL ONCE
Status: CANCELLED | OUTPATIENT
Start: 2022-09-29 | End: 2022-09-29

## 2022-09-29 RX ORDER — DOXYCYCLINE HYCLATE 100 MG
100 TABLET ORAL 2 TIMES DAILY
Qty: 14 TABLET | Refills: 0 | Status: SHIPPED | OUTPATIENT
Start: 2022-09-29 | End: 2022-10-05 | Stop reason: ALTCHOICE

## 2022-09-29 RX ORDER — LIDOCAINE HYDROCHLORIDE 40 MG/ML
SOLUTION TOPICAL ONCE
Status: CANCELLED | OUTPATIENT
Start: 2022-09-29 | End: 2022-09-29

## 2022-09-29 RX ADMIN — LIDOCAINE HYDROCHLORIDE 5 ML: 40 SOLUTION TOPICAL at 09:23

## 2022-09-29 ASSESSMENT — PAIN DESCRIPTION - LOCATION: LOCATION: FOOT

## 2022-09-29 ASSESSMENT — PAIN SCALES - GENERAL: PAINLEVEL_OUTOF10: 7

## 2022-09-29 ASSESSMENT — ENCOUNTER SYMPTOMS
DIARRHEA: 0
VOMITING: 0
NAUSEA: 0

## 2022-09-29 ASSESSMENT — PAIN DESCRIPTION - DESCRIPTORS: DESCRIPTORS: ACHING;THROBBING

## 2022-09-29 ASSESSMENT — PAIN DESCRIPTION - ONSET: ONSET: ON-GOING

## 2022-09-29 ASSESSMENT — PAIN - FUNCTIONAL ASSESSMENT: PAIN_FUNCTIONAL_ASSESSMENT: PREVENTS OR INTERFERES SOME ACTIVE ACTIVITIES AND ADLS

## 2022-09-29 ASSESSMENT — PAIN DESCRIPTION - FREQUENCY: FREQUENCY: CONTINUOUS

## 2022-09-29 ASSESSMENT — PAIN DESCRIPTION - ORIENTATION: ORIENTATION: LEFT

## 2022-09-29 NOTE — LETTER
[address]  [address]  Phone: [phone]    Papi Mejia        September 29, 2022     Patient: Yareli Burks   YOB: 1980   Date of Visit: 9/29/2022       To Whom It May Concern: It is my medical opinion that Paolo Thompson should remain out of work until 10/29/22. If you have any questions or concerns, please don't hesitate to call.     Sincerely,        Neri Oliveira DPM

## 2022-09-29 NOTE — PROGRESS NOTES
[address]   Progress Note and Procedure Note      Mercedes Bah RECORD NUMBER:  [id number]  AGE: 38 y.o. GENDER: male  : 1980  EPISODE DATE:  2022    Subjective:     Chief Complaint   Patient presents with    Wound Check     Left foot         HISTORY of PRESENT ILLNESS HPI     Uday Horton is a 38 y.o. male who presents today for wound/ulcer evaluation. History of Wound Context: Patient presents with his wife today for evaluation of left foot injury. He states that last Friday while at work a large rock hit his foot and caused a laceration. He was seen in the emergency room where the laceration was repaired within a few hours. He was discharged on Keflex. He saw occupational health 2 days ago and was changed to Augmentin. States that he did have a fever a few days ago but this has resolved. States that he has had a MRSA infection in the past.    Ulcer Identification:  Ulcer Type: traumatic  Contributing Factors: diabetes          PAST MEDICAL HISTORY        Diagnosis Date    Diabetes mellitus ([facility] [address]     Hyperlipidemia     Hypertension        PAST SURGICAL HISTORY    History reviewed. No pertinent surgical history. FAMILY HISTORY    History reviewed. No pertinent family history.     SOCIAL HISTORY    Social History     Tobacco Use    Smoking status: Never    Smokeless tobacco: Never   Vaping Use    Vaping Use: Never used   Substance Use Topics    Alcohol use: Yes     Comment: ocassional    Drug use: No       ALLERGIES    No Known Allergies    MEDICATIONS    Current Outpatient Medications on File Prior to Encounter   Medication Sig Dispense Refill    amoxicillin-clavulanate (AUGMENTIN) 875-125 MG per tablet       hydroCHLOROthiazide (HYDRODIURIL) 12.5 MG tablet hydrochlorothiazide 12.5 mg tablet      insulin glargine (BASAGLAR KWIKPEN) 100 UNIT/ML injection pen Basaglar KwikPen U-100 Insulin 100 unit/mL (3 mL) subcutaneous   INJECT 60 UNITS UNDER Margins Attached edges 09/29/22 0923   Wound Thickness Description not for Pressure Injury Full thickness 09/29/22 4913   Number of days: 0            Assessment:      Active Hospital Problems    Diagnosis Date Noted    Laceration of left foot [S91.312A] 09/29/2022     Priority: Medium    Cellulitis of left foot [L03.116] 09/29/2022     Priority: Medium    Contusion of left foot [S90.32XA] 09/29/2022     Priority: Medium       Plan:     Treatment Note please see attached Discharge Instructions    Emergency room note and labs reviewed     XR reviewed     Rx doxycycline 100 mg twice daily due to history of MRSA infection     Educated on signs and symptoms of infection. Instructed to call clinic immediately or go to ER if signs and symptoms of infection are present. Silvercel to the incision line daily    Instructed to elevate above the level of the heart as much as possible  Use crutches to     RTC 1 week         PWritten patient discharge instructions given to patient and signed by patient or POA. Orders Placed This Encounter   Medications    doxycycline hyclate (VIBRA-TABS) 100 MG tablet     Sig: Take 1 tablet by mouth 2 times daily for 7 days     Dispense:  14 tablet     Refill:  0     No orders of the defined types were placed in this encounter. Discharge Instructions           [address]      Visit  Discharge Instructions / Physician Orders  DATE: 9/29/22     Home Care: None     SUPPLIES ORDERED THRU:    Workers Comp.                  DATE LAST SUPPLIED      Wound Location:  Left anterior foot     Cleanse with: Liquid antibacterial soap and water, rinse well      Dressing Orders:  Primary dressing    silvercel rope (lay on top of incision line)   Secondary dressing      abd pad, kerlix     secure with   paper tape and ace wrap toe to knee (ace wrap on during the day, off at night)       x 30days     Frequency:  Daily     Additional Orders: Increase protein to diet (meat, cheese, eggs, fish, peanut butter, nuts and beans)  Multivitamin daily     Doxycycline at your pharmacy and continue to take Augmentin    OFFLOADING [x] YES  TYPE:       crutches           [] NA    Weekly wound care visits until determined otherwise. Antibiotic therapy-wound care related YES [x] NO [] NA[]    MY CHART []     Smart Device  []                  Your next appointment with the [facility] is in 1 week                                                                                                   (Please note your next appointment above and if you are unable to keep, kindly give a 24 hour notice. Thank you.)  If more than 15 min late we cannot guarantee you will be seen due to clinician schedule  Per Policy, Excessive cancellation will call for dismissal from program.  If you experience any of the following, please call the [facility] during business hours:  [phone]     * Increase in Pain  * Temperature over 101  * Increase in drainage from your wound  * Drainage with a foul odor  * Bleeding  * Increase in swelling  * Need for compression bandage changes due to slippage, breakthrough drainage. If you need medical attention outside of the business hours of the [facility] please contact your PCP or go to the nearest emergency room. The information contained in the After Visit Summary has been reviewed with me, the patient and/or responsible adult, by my health care provider(s). I had the opportunity to ask questions regarding this information.  I have elected to receive;      []After Visit Summary  [x]Comprehensive Discharge Instruction      Patient signature______________________________________Date:________  Electronically signed by Contreras Buckley RN on 9/29/2022 at 8:52 AM    Electronically signed by Peg Rodriges DPM on 9/29/2022 at 8:47 AM        Electronically signed by Peg Rodriges DPM on 9/29/2022 at 9:47 AM

## 2022-09-29 NOTE — DISCHARGE INSTRUCTIONS
[address]      Visit  Discharge Instructions / Physician Orders  DATE: 9/29/22     Home Care: None     SUPPLIES ORDERED THRU:    Workers Comp. DATE LAST SUPPLIED      Wound Location:  Left anterior foot     Cleanse with: Liquid antibacterial soap and water, rinse well      Dressing Orders:  Primary dressing    silvercel rope (lay on top of incision line)   Secondary dressing      abd pad, kerlix     secure with   paper tape and ace wrap toe to knee (ace wrap on during the day, off at night)       x 30days     Frequency:  Daily     Additional Orders: Increase protein to diet (meat, cheese, eggs, fish, peanut butter, nuts and beans)  Multivitamin daily     Doxycycline at your pharmacy and continue to take Augmentin    OFFLOADING [x] YES  TYPE:       crutches           [] NA    Weekly wound care visits until determined otherwise. Antibiotic therapy-wound care related YES [x] NO [] NA[]    MY CHART []     Smart Device  []                  Your next appointment with the [facility] is in 1 week                                                                                                   (Please note your next appointment above and if you are unable to keep, kindly give a 24 hour notice. Thank you.)  If more than 15 min late we cannot guarantee you will be seen due to clinician schedule  Per Policy, Excessive cancellation will call for dismissal from program.  If you experience any of the following, please call the [facility] during business hours:  [phone]     * Increase in Pain  * Temperature over 101  * Increase in drainage from your wound  * Drainage with a foul odor  * Bleeding  * Increase in swelling  * Need for compression bandage changes due to slippage, breakthrough drainage.      If you need medical attention outside of the business hours of the [email or web address] please contact your PCP or go to the nearest emergency room.     The information contained in the After Visit Summary has been reviewed with me, the patient and/or responsible adult, by my health care provider(s). I had the opportunity to ask questions regarding this information.  I have elected to receive;      []After Visit Summary  [x]Comprehensive Discharge Instruction      Patient signature______________________________________Date:________  Electronically signed by Raudel Duffy RN on 9/29/2022 at 8:52 AM    Electronically signed by Christopher Leiva DPM on 9/29/2022 at 8:47 AM

## 2022-10-03 NOTE — DISCHARGE INSTRUCTIONS
[address]                                 Visit  Discharge Instructions / Physician Orders  DATE: 10/5/22     Home Care: None     SUPPLIES ORDERED THRU:    Workers Comp. DATE LAST SUPPLIED      Wound Location:  Left anterior foot     Cleanse with: Liquid antibacterial soap and water, rinse well      Dressing Orders:  Primary dressing    silvercel rope (lay on top of incision line)   Secondary dressing      abd pad, kerlix     secure with   paper tape and ace wrap toe to knee (ace wrap on during the day, off at night)       x 30days     Frequency:  Daily     Additional Orders: Increase protein to diet (meat, cheese, eggs, fish, peanut butter, nuts and beans)  Multivitamin daily      Doxycycline at your pharmacy and continue to take Augmentin     OFFLOADING [x] YES  TYPE:       crutches           [] NA     Weekly wound care visits until determined otherwise. Antibiotic therapy-wound care related YES [x] NO [] NA[]     MY CHART []     Smart Device  []                  Your next appointment with the [facility] is in 1 week                                                                                                   (Please note your next appointment above and if you are unable to keep, kindly give a 24 hour notice. Thank you.)  If more than 15 min late we cannot guarantee you will be seen due to clinician schedule  Per Policy, Excessive cancellation will call for dismissal from program.  If you experience any of the following, please call the [facility] during business hours:  [phone]     * Increase in Pain  * Temperature over 101  * Increase in drainage from your wound  * Drainage with a foul odor  * Bleeding  * Increase in swelling  * Need for compression bandage changes due to slippage, breakthrough drainage.      If you need medical attention outside of the business hours of the [facility] please contact your PCP or go to the nearest emergency room. The information contained in the After Visit Summary has been reviewed with me, the patient and/or responsible adult, by my health care provider(s). I had the opportunity to ask questions regarding this information.  I have elected to receive;      []After Visit Summary  [x]Comprehensive Discharge Instruction        Patient signature______________________________________Date:________

## 2022-10-05 ENCOUNTER — HOSPITAL ENCOUNTER (OUTPATIENT)
Dept: WOUND CARE | Age: 42
Discharge: HOME | End: 2022-10-05
Payer: COMMERCIAL

## 2022-10-05 VITALS
RESPIRATION RATE: 20 BRPM | DIASTOLIC BLOOD PRESSURE: 74 MMHG | HEART RATE: 94 BPM | SYSTOLIC BLOOD PRESSURE: 149 MMHG | TEMPERATURE: 98.4 F

## 2022-10-05 DIAGNOSIS — S91.312A LACERATION OF LEFT FOOT, INITIAL ENCOUNTER: Primary | ICD-10-CM

## 2022-10-05 PROCEDURE — 99213 OFFICE O/P EST LOW 20 MIN: CPT

## 2022-10-05 RX ORDER — LIDOCAINE HYDROCHLORIDE 40 MG/ML
SOLUTION TOPICAL ONCE
Status: CANCELLED | OUTPATIENT
Start: 2022-10-05 | End: 2022-10-05

## 2022-10-05 RX ORDER — CLOBETASOL PROPIONATE 0.5 MG/G
OINTMENT TOPICAL ONCE
Status: CANCELLED | OUTPATIENT
Start: 2022-10-05 | End: 2022-10-05

## 2022-10-05 RX ORDER — LIDOCAINE 50 MG/G
OINTMENT TOPICAL ONCE
Status: CANCELLED | OUTPATIENT
Start: 2022-10-05 | End: 2022-10-05

## 2022-10-05 RX ORDER — LIDOCAINE HYDROCHLORIDE 40 MG/ML
SOLUTION TOPICAL ONCE
Status: DISCONTINUED | OUTPATIENT
Start: 2022-10-05 | End: 2022-10-06 | Stop reason: HOSPADM

## 2022-10-05 RX ORDER — GENTAMICIN SULFATE 1 MG/G
OINTMENT TOPICAL ONCE
Status: CANCELLED | OUTPATIENT
Start: 2022-10-05 | End: 2022-10-05

## 2022-10-05 RX ORDER — BACITRACIN ZINC AND POLYMYXIN B SULFATE 500; 1000 [USP'U]/G; [USP'U]/G
OINTMENT TOPICAL ONCE
Status: CANCELLED | OUTPATIENT
Start: 2022-10-05 | End: 2022-10-05

## 2022-10-05 RX ORDER — DOXYCYCLINE HYCLATE 100 MG
100 TABLET ORAL 2 TIMES DAILY
Qty: 14 TABLET | Refills: 0 | Status: SHIPPED | OUTPATIENT
Start: 2022-10-05 | End: 2022-10-12

## 2022-10-05 RX ORDER — LIDOCAINE 40 MG/G
CREAM TOPICAL ONCE
Status: CANCELLED | OUTPATIENT
Start: 2022-10-05 | End: 2022-10-05

## 2022-10-05 RX ORDER — LIDOCAINE HYDROCHLORIDE 20 MG/ML
JELLY TOPICAL ONCE
Status: CANCELLED | OUTPATIENT
Start: 2022-10-05 | End: 2022-10-05

## 2022-10-05 RX ORDER — BETAMETHASONE DIPROPIONATE 0.05 %
OINTMENT (GRAM) TOPICAL ONCE
Status: CANCELLED | OUTPATIENT
Start: 2022-10-05 | End: 2022-10-05

## 2022-10-05 RX ORDER — BACITRACIN, NEOMYCIN, POLYMYXIN B 400; 3.5; 5 [USP'U]/G; MG/G; [USP'U]/G
OINTMENT TOPICAL ONCE
Status: CANCELLED | OUTPATIENT
Start: 2022-10-05 | End: 2022-10-05

## 2022-10-05 RX ORDER — GINSENG 100 MG
CAPSULE ORAL ONCE
Status: CANCELLED | OUTPATIENT
Start: 2022-10-05 | End: 2022-10-05

## 2022-10-05 ASSESSMENT — PAIN DESCRIPTION - DESCRIPTORS: DESCRIPTORS: ACHING;THROBBING

## 2022-10-05 ASSESSMENT — ENCOUNTER SYMPTOMS
NAUSEA: 0
VOMITING: 0
DIARRHEA: 0

## 2022-10-05 ASSESSMENT — PAIN SCALES - GENERAL: PAINLEVEL_OUTOF10: 7

## 2022-10-05 ASSESSMENT — PAIN DESCRIPTION - LOCATION: LOCATION: FOOT

## 2022-10-05 ASSESSMENT — PAIN DESCRIPTION - FREQUENCY: FREQUENCY: CONTINUOUS

## 2022-10-05 ASSESSMENT — PAIN DESCRIPTION - ORIENTATION: ORIENTATION: LEFT

## 2022-10-05 NOTE — PROGRESS NOTES
[address]   Progress Note and Procedure Note      Mercedes Bah RECORD NUMBER:  [id number]  AGE: 38 y.o. GENDER: male  : 1980  EPISODE DATE:  10/5/2022    Subjective:     Chief Complaint   Patient presents with    Wound Check     Left lower leg         HISTORY of PRESENT ILLNESS HPI     Gina Jones is a 38 y.o. male who presents today for wound/ulcer evaluation. Interval history: He notes that the redness has been improving slowly. No systemic sign of infection. States he has been using his crutches to stay off of the foot. History of Wound Context: Patient presents with his wife today for evaluation of left foot injury. He states that last Friday while at work a large rock hit his foot and caused a laceration. He was seen in the emergency room where the laceration was repaired within a few hours. He was discharged on Keflex. He saw occupational health 2 days ago and was changed to Augmentin. States that he did have a fever a few days ago but this has resolved. States that he has had a MRSA infection in the past.    Ulcer Identification:  Ulcer Type: traumatic  Contributing Factors: diabetes          PAST MEDICAL HISTORY        Diagnosis Date    Diabetes mellitus ([address]     Hyperlipidemia     Hypertension        PAST SURGICAL HISTORY    No past surgical history on file. FAMILY HISTORY    No family history on file.     SOCIAL HISTORY    Social History     Tobacco Use    Smoking status: Never    Smokeless tobacco: Never   Vaping Use    Vaping Use: Never used   Substance Use Topics    Alcohol use: Yes     Comment: ocassional    Drug use: No       ALLERGIES    No Known Allergies    MEDICATIONS    Current Outpatient Medications on File Prior to Encounter   Medication Sig Dispense Refill    amoxicillin-clavulanate (AUGMENTIN) 875-125 MG per tablet       hydroCHLOROthiazide (HYDRODIURIL) 12.5 MG tablet hydrochlorothiazide 12.5 mg tablet      insulin glargine (BASAGLAR KWIKPEN) 100 UNIT/ML injection pen Basaglar KwikPen U-100 Insulin 100 unit/mL (3 mL) subcutaneous   INJECT 60 UNITS UNDER THE SKIN ONCE DAILY      NIFEdipine (PROCARDIA XL) 90 MG extended release tablet nifedipine ER 90 mg tablet,extended release 24 hr   TAKE 1 TABLET BY MOUTH EVERY DAY FOR 90 DAYS      dapagliflozin (FARXIGA) 10 MG tablet Farxiga 10 mg tablet   TAKE 1 TABLET BY MOUTH EVERY DAY      SITagliptin (JANUVIA) 100 MG tablet Take 100 mg by mouth daily      hydrALAZINE (APRESOLINE) 100 MG tablet Take 100 mg by mouth 2 times daily      lisinopril (PRINIVIL;ZESTRIL) 5 MG tablet Take 5 mg by mouth daily      amLODIPine (NORVASC) 10 MG tablet Take 10 mg by mouth daily (Patient not taking: No sig reported)      metoprolol succinate (TOPROL XL) 25 MG extended release tablet Take 1 tablet by mouth daily (Patient not taking: No sig reported) 14 tablet 0    amLODIPine (NORVASC) 10 MG tablet Take 1 tablet by mouth daily (Patient not taking: No sig reported) 14 tablet 0    lisinopril-hydrochlorothiazide (PRINZIDE;ZESTORETIC) 20-12.5 MG per tablet Take 1 tablet by mouth 2 times daily (Patient not taking: No sig reported) 28 tablet 0    ibuprofen (ADVIL;MOTRIN) 800 MG tablet Take 1 tablet by mouth every 8 hours as needed for Pain 30 tablet 0    sitaGLIPtan-metFORMIN (JANUMET)  MG per tablet Take 1 tablet by mouth 2 times daily (with meals). (Patient not taking: Reported on 9/23/2022)      rosuvastatin (CRESTOR) 10 MG tablet Take 10 mg by mouth daily. No current facility-administered medications on file prior to encounter. REVIEW OF SYSTEMS    Review of Systems   Constitutional:  Negative for chills and fever. Gastrointestinal:  Negative for diarrhea, nausea and vomiting. Skin:  Positive for wound.        Objective:      BP (!) 149/74   Pulse 94   Temp 98.4 °F (36.9 °C) (Tympanic)   Resp 20     Wt Readings from Last 3 Encounters:   09/29/22 260 lb (117.9 kg)   09/23/22 260 lb (117.9 kg)   09/29/17 280 lb (127 kg)       Physical Exam:  General:  Alert and oriented x3. In no acute distress. Lower Extremity Physical Exam:    Vascular: DP pulses are palpable, Bilateral. PT pulses are palpable, Bilateral. CFT <3 seconds to all digits, Bilateral.  Non-pitting edema localized to the left foot and ankle mildly improved from previous. Hair growth is present to the level of the digits, Bilateral.     Neuro: Saph/sural/SP/DP/plantar sensation intact to light touch. Musculoskeletal: EHL/FHL/GS/TA gross motor intact. Gross deformity is absent. Dermatologic: Laceration to the medial left foot near the ankle which is well coapted with Prolene sutures. No gapping upon removal of sutures. Negative probe to bone. There is erythema and ecchymosis at the new-wound. No ascending erythema. Skin temp is equal bilateral.  There is no purulent drainage. There is no fluctuance or crepitus.  Interdigital maceration absent, Bilateral.     Wound 09/29/22 Foot Anterior;Left;Medial wound #1 (Active)   Wound Image   09/29/22 0923   Wound Etiology Traumatic 10/05/22 1321   Dressing Status Old drainage noted 10/05/22 1321   Wound Cleansed Cleansed with saline 10/05/22 1321   Dressing/Treatment Other (comment) 09/29/22 1009   Wound Length (cm) 3 cm 10/05/22 1321   Wound Width (cm) 0.4 cm 10/05/22 1321   Wound Depth (cm) 0.1 cm 10/05/22 1321   Wound Surface Area (cm^2) 1.2 cm^2 10/05/22 1321   Change in Wound Size % (l*w) 58.62 10/05/22 1321   Wound Volume (cm^3) 0.12 cm^3 10/05/22 1321   Wound Healing % 59 10/05/22 1321   Post-Procedure Length (cm) 2.9 cm 09/29/22 0923   Post-Procedure Width (cm) 1 cm 09/29/22 0923   Post-Procedure Depth (cm) 0.1 cm 09/29/22 0923   Post-Procedure Surface Area (cm^2) 2.9 cm^2 09/29/22 0923   Post-Procedure Volume (cm^3) 0.29 cm^3 09/29/22 0923   Wound Assessment Devitalized tissue 10/05/22 1321   Drainage Amount Moderate 10/05/22 1321   Drainage Description Serosanguinous 10/05/22 1321   Odor None 10/05/22 1321   Vidhya-wound Assessment Blanchable erythema 10/05/22 1321   Margins Attached edges 10/05/22 1321   Wound Thickness Description not for Pressure Injury Full thickness 09/29/22 7070   Number of days: 6            Assessment:      Active Hospital Problems    Diagnosis Date Noted    Laceration of left foot [S91.312A] 09/29/2022     Priority: Medium    Cellulitis of left foot [Z05.563] 09/29/2022     Priority: Medium    Contusion of left foot [S90.32XA] 09/29/2022     Priority: Medium       Plan:     Treatment Note please see attached Discharge Instructions    Rx doxycycline 100 mg twice daily for 7 more days due to history of MRSA infection     Educated on signs and symptoms of infection. Instructed to call clinic immediately or go to ER if signs and symptoms of infection are present. Sutures removed and Steri-Strips applied  Silvercel to the incision line daily    Instructed to elevate above the level of the heart as much as possible  Use crutches to remain nonweightbearing to the left foot    RTC 1 week         PWritten patient discharge instructions given to patient and signed by patient or POA.       Orders Placed This Encounter   Medications    lidocaine (XYLOCAINE) 4 % external solution    doxycycline hyclate (VIBRA-TABS) 100 MG tablet     Sig: Take 1 tablet by mouth 2 times daily for 7 days     Dispense:  14 tablet     Refill:  0     Orders Placed This Encounter   Procedures    Initiate Outpatient Wound Care Protocol     Cleanse wound with saline    If wound contains bioburden or contamination cleanse with wound cleanser or antimicrobial solution     For normal periwound tissue without irritation nor maceration, apply topical skin protectant    For periwound tissue with irritation and/or maceration, apply zinc based product, topical steroid cream/ointment, or equivalent     For wounds with dry firm black eschar and/or without exudate, apply betadine and leave open to air      For wounds with scant/small to no exudate or drainage, apply wound gel, hydrocolloid, polymer, or equivalent and cover with secondary dressing/foam      For wounds with moderate/large exudate or drainage, apply alginate, hydrofiber, polymer, or equivalent and cover with secondary dressing/foam    For wounds with nonviable tissue requiring removal, apply chemical or mechanical debrider and cover with secondary dressing/foam    For wounds with tunneling, dead space, or cavity, fill or pack with strip/gauze/kerlex to fit and cover with secondary dressing/foam    For wounds with adequate granulation or epithelization, apply wound gel, hydrocolloid, polymer, collagen, or transparent film, and cover secondary dry dressing/foam    For wounds that need additional secondary dressing to help pad or control additional drainage/exudates, add foam, absorbent pad or hydrocolloid    For wounds with suspected or known infection, apply antimicrobial mesh and/or antimicrobial alginate/hydrofiber, or antimicrobial solution moistened gauze/kerlex, or equivalent and cover with secondary dressing/foam    Compression Management needed for edema control, apply multilayer compression or tubular garment or equivalent    Offloading Management needed for pressure relief, apply offloading shoe/boot or equivalent     Standing Status:   Standing     Number of Occurrences:   1            Discharge Instructions         [address] [place] [facility]                                 Visit  Discharge Instructions / Physician Orders  DATE: 10/5/22     Home Care: None     SUPPLIES ORDERED THRU:    Workers Comp.                  DATE LAST SUPPLIED      Wound Location:  Left anterior foot     Cleanse with: Liquid antibacterial soap and water, rinse well      Dressing Orders:  Primary dressing    silvercel rope (lay on top of incision line)   Secondary dressing      abd pad, kerlix     secure with   paper tape and ace wrap toe to knee (ace wrap on during the day, off at night)       x 30days     Frequency:  Daily     Additional Orders: Increase protein to diet (meat, cheese, eggs, fish, peanut butter, nuts and beans)  Multivitamin daily     OFFLOADING [x] YES  TYPE:       crutches           [] NA     Weekly wound care visits until determined otherwise. Antibiotic therapy-wound care related YES [x] NO [] NA[]     MY CHART []     Smart Device  []                  Your next appointment with the [address] [place] [address] is in 1 week                                                                                                   (Please note your next appointment above and if you are unable to keep, kindly give a 24 hour notice. Thank you.)  If more than 15 min late we cannot guarantee you will be seen due to clinician schedule  Per Policy, Excessive cancellation will call for dismissal from program.  If you experience any of the following, please call the [address] [place] [address] during business hours:  [phone]     * Increase in Pain  * Temperature over 101  * Increase in drainage from your wound  * Drainage with a foul odor  * Bleeding  * Increase in swelling  * Need for compression bandage changes due to slippage, breakthrough drainage. If you need medical attention outside of the business hours of the [address] [place] [address] please contact your PCP or go to the nearest emergency room. The information contained in the After Visit Summary has been reviewed with me, the patient and/or responsible adult, by my health care provider(s). I had the opportunity to ask questions regarding this information.  I have elected to receive;      []After Visit Summary  [x]Comprehensive Discharge Instruction        Patient signature______________________________________Date:________  Electronically signed by Fredrick Latham RN on 10/5/2022 at 1:34 PM   Electronically signed by Shelah Najjar, DPM on 10/5/2022 at 12:54 PM        Electronically signed by Flip Martinez Darlis Cowden, DPM on 10/5/2022 at 1:35 PM

## 2022-10-05 NOTE — DISCHARGE INSTRUCTIONS
[address]                                 Visit  Discharge Instructions / Physician Orders  DATE: 10/5/22     Home Care: None     SUPPLIES ORDERED THRU:    Workers Comp. DATE LAST SUPPLIED      Wound Location:  Left anterior foot     Cleanse with: Liquid antibacterial soap and water, rinse well      Dressing Orders:  Primary dressing    silvercel rope (lay on top of incision line)   Secondary dressing      abd pad, kerlix     secure with   paper tape and ace wrap toe to knee (ace wrap on during the day, off at night)       x 30days     Frequency:  Daily     Additional Orders: Increase protein to diet (meat, cheese, eggs, fish, peanut butter, nuts and beans)  Multivitamin daily     OFFLOADING [x] YES  TYPE:       crutches           [] NA     Weekly wound care visits until determined otherwise. Antibiotic therapy-wound care related YES [x] NO [] NA[]     MY CHART []     Smart Device  []                  Your next appointment with the [facility] is in 1 week                                                                                                   (Please note your next appointment above and if you are unable to keep, kindly give a 24 hour notice. Thank you.)  If more than 15 min late we cannot guarantee you will be seen due to clinician schedule  Per Policy, Excessive cancellation will call for dismissal from program.  If you experience any of the following, please call the [facility] during business hours:  [phone]     * Increase in Pain  * Temperature over 101  * Increase in drainage from your wound  * Drainage with a foul odor  * Bleeding  * Increase in swelling  * Need for compression bandage changes due to slippage, breakthrough drainage. If you need medical attention outside of the business hours of the [address] [place] [address] please contact your PCP or go to the nearest emergency room.      The information contained in the After Visit Summary has been reviewed with me, the patient and/or responsible adult, by my health care provider(s). I had the opportunity to ask questions regarding this information.  I have elected to receive;      []After Visit Summary  [x]Comprehensive Discharge Instruction        Patient signature______________________________________Date:________  Electronically signed by Umair Araya RN on 10/5/2022 at 1:34 PM   Electronically signed by Cely Valera DPM on 10/5/2022 at 12:54 PM

## 2022-10-05 NOTE — PLAN OF CARE
Problem: Chronic Conditions and Co-morbidities  Goal: Patient's chronic conditions and co-morbidity symptoms are monitored and maintained or improved  Outcome: Progressing     Problem: Discharge Planning  Goal: Discharge to home or other facility with appropriate resources  Outcome: Progressing     Problem: Safety - Adult  Goal: Free from fall injury  Outcome: Progressing     Problem: Skin/Tissue Integrity - Adult  Goal: Skin integrity remains intact  Outcome: Progressing  Goal: Incisions, wounds, or drain sites healing without S/S of infection  Outcome: Progressing

## 2022-10-05 NOTE — PROGRESS NOTES
Was able to reach Elias Zhong from [employer] via secured -mail today. She sent claim number and  regarding [place] workers comp case to me. Will attempt to call Workers comp  today.

## 2022-10-06 ENCOUNTER — HOSPITAL ENCOUNTER (OUTPATIENT)
Dept: WOUND CARE | Age: 42
Discharge: HOME | End: 2022-10-06

## 2022-10-06 NOTE — PROGRESS NOTES
Wound Care Supplies      Supply Company:     [employer] [address] [place] [address] St:     [address] [place] [id number]  [address] Dept: [employer] [phone]    Patient Information:      Real Bennett  [address] [place] [id number]   [phone]   : 1980  AGE: 38 y.o. GENDER: male   EPISODE DATE: 10/5/2022    Insurance:      PRIMARY INSURANCE:  Plan:   Coverage:   Effective Date:   Group Number: [unfilled]  Subscriber Number: [id number] - (Worker's Comp)    Payer/Plan Subscr  Sex Relation Sub. Ins. ID Effective Group Num   1. GENERIC MCO -* ADIN MARTIN 1980 Male Self [id number]                                     [address] [place] [address]     C-9 sent to workers comp 10/6/22  Claim # [id number]  Elver Robles from [facility] phone #: [phone] ext.  [phone]    Patient Wound Information:      Problem List Items Addressed This Visit          Other    Laceration of left foot - Primary       WOUNDS REQUIRING DRESSING SUPPLIES:     Wound 22 Foot Anterior;Left;Medial wound #1 (Active)   Wound Image   22 0923   Wound Etiology Traumatic 10/05/22 132   Dressing Status Old drainage noted 10/05/22 1321   Wound Cleansed Cleansed with saline 10/05/22 1321   Dressing/Treatment Other (comment) 22 1009   Wound Length (cm) 3 cm 10/05/22 1321   Wound Width (cm) 0.4 cm 10/05/22 1321   Wound Depth (cm) 0.1 cm 10/05/22 1321   Wound Surface Area (cm^2) 1.2 cm^2 10/05/22 132   Change in Wound Size % (l*w) 58.62 10/05/22 1321   Wound Volume (cm^3) 0.12 cm^3 10/05/22 1321   Wound Healing % 59 10/05/22 1321   Post-Procedure Length (cm) 3 cm 10/05/22 1321   Post-Procedure Width (cm) 0.4 cm 10/05/22 1321   Post-Procedure Depth (cm) 0.1 cm 10/05/22 1321   Post-Procedure Surface Area (cm^2) 1.2 cm^2 10/05/22 1321   Post-Procedure Volume (cm^3) 0.12 cm^3 10/05/22 1321   Wound Assessment Devitalized tissue 10/05/22 1321 Drainage Amount Moderate 10/05/22 1321   Drainage Description Serosanguinous 10/05/22 1321   Odor None 10/05/22 1321   Vidhya-wound Assessment Blanchable erythema 10/05/22 1321   Margins Attached edges 10/05/22 1321   Wound Thickness Description not for Pressure Injury Full thickness 09/29/22 0923   Number of days: 7          Supplies Requested :      WOUND #: 1   PRIMARY DRESSING:  Other: Silvercel   Cover and Secure with: ABD pad, 4 inch kerlix     FREQUENCY OF DRESSING CHANGES:  Daily       ADDITIONAL ITEMS:  [] Gloves Small  [] Gloves Medium [x] Gloves Large [] Gloves XLarge  [] Tape 1\" [x] Tape 2\" [] Tape 3\"  [] Medipore Tape  [] Saline  [] Skin Prep   [] Adhesive Remover   [] Cotton Tip Applicators   [] Other:    Patient Wound(s) Debrided: [] Yes if yes please add date  [x] No    Is the patient currently on an antibiotic for their Wound(s): [x] Yes if yes please add name and dose doxycycline 2x/day [] No    Patient currently being seen by Home Health: [] Yes   [x] No    Duration for needed supplies:  []15  [x]30  []60  []90 Days    Electronically signed by Cele Whitney RN on 10/5/2022 at 4:04 PM     Provider Information:      PROVIDER'S NAME: Ashley Ramirez DPM  [id number]

## 2022-10-07 ENCOUNTER — TELEPHONE (OUTPATIENT)
Dept: WOUND CARE | Age: 42
End: 2022-10-07

## 2022-10-07 NOTE — TELEPHONE ENCOUNTER
Supply order forwarded to [employer]. Hal stating that C9 was sent to [facility]- Adebayo Alex. Spoke with Adebayo Alex From [employer] as she was requesting Work limitation form. Explained to her that This form needs rosio completed by [facility] or [facility]. We will be managing wound care only. Adebayo Alex Verbalized understanding.

## 2022-10-12 ENCOUNTER — HOSPITAL ENCOUNTER (OUTPATIENT)
Dept: WOUND CARE | Age: 42
Discharge: HOME | End: 2022-10-12
Payer: COMMERCIAL

## 2022-10-12 VITALS
RESPIRATION RATE: 20 BRPM | SYSTOLIC BLOOD PRESSURE: 147 MMHG | WEIGHT: 260 LBS | DIASTOLIC BLOOD PRESSURE: 85 MMHG | BODY MASS INDEX: 32.33 KG/M2 | HEART RATE: 97 BPM | HEIGHT: 75 IN | TEMPERATURE: 98 F

## 2022-10-12 DIAGNOSIS — L97.522 ULCER OF LEFT FOOT, WITH FAT LAYER EXPOSED (HCC): ICD-10-CM

## 2022-10-12 DIAGNOSIS — S91.312A LACERATION OF LEFT FOOT, INITIAL ENCOUNTER: Primary | ICD-10-CM

## 2022-10-12 PROCEDURE — 99213 OFFICE O/P EST LOW 20 MIN: CPT

## 2022-10-12 RX ORDER — LIDOCAINE HYDROCHLORIDE 20 MG/ML
JELLY TOPICAL ONCE
Status: CANCELLED | OUTPATIENT
Start: 2022-10-12 | End: 2022-10-12

## 2022-10-12 RX ORDER — BACITRACIN ZINC AND POLYMYXIN B SULFATE 500; 1000 [USP'U]/G; [USP'U]/G
OINTMENT TOPICAL ONCE
Status: CANCELLED | OUTPATIENT
Start: 2022-10-12 | End: 2022-10-12

## 2022-10-12 RX ORDER — LIDOCAINE HYDROCHLORIDE 40 MG/ML
SOLUTION TOPICAL ONCE
Status: COMPLETED | OUTPATIENT
Start: 2022-10-12 | End: 2022-10-12

## 2022-10-12 RX ORDER — BACITRACIN, NEOMYCIN, POLYMYXIN B 400; 3.5; 5 [USP'U]/G; MG/G; [USP'U]/G
OINTMENT TOPICAL ONCE
Status: CANCELLED | OUTPATIENT
Start: 2022-10-12 | End: 2022-10-12

## 2022-10-12 RX ORDER — LIDOCAINE 40 MG/G
CREAM TOPICAL ONCE
Status: CANCELLED | OUTPATIENT
Start: 2022-10-12 | End: 2022-10-12

## 2022-10-12 RX ORDER — GENTAMICIN SULFATE 1 MG/G
OINTMENT TOPICAL ONCE
Status: CANCELLED | OUTPATIENT
Start: 2022-10-12 | End: 2022-10-12

## 2022-10-12 RX ORDER — GINSENG 100 MG
CAPSULE ORAL ONCE
Status: CANCELLED | OUTPATIENT
Start: 2022-10-12 | End: 2022-10-12

## 2022-10-12 RX ORDER — LIDOCAINE HYDROCHLORIDE 40 MG/ML
SOLUTION TOPICAL ONCE
Status: CANCELLED | OUTPATIENT
Start: 2022-10-12 | End: 2022-10-12

## 2022-10-12 RX ORDER — LIDOCAINE 50 MG/G
OINTMENT TOPICAL ONCE
Status: CANCELLED | OUTPATIENT
Start: 2022-10-12 | End: 2022-10-12

## 2022-10-12 RX ORDER — CLOBETASOL PROPIONATE 0.5 MG/G
OINTMENT TOPICAL ONCE
Status: CANCELLED | OUTPATIENT
Start: 2022-10-12 | End: 2022-10-12

## 2022-10-12 RX ORDER — BETAMETHASONE DIPROPIONATE 0.05 %
OINTMENT (GRAM) TOPICAL ONCE
Status: CANCELLED | OUTPATIENT
Start: 2022-10-12 | End: 2022-10-12

## 2022-10-12 RX ADMIN — LIDOCAINE HYDROCHLORIDE 10 ML: 40 SOLUTION TOPICAL at 13:30

## 2022-10-12 ASSESSMENT — PAIN DESCRIPTION - ONSET: ONSET: ON-GOING

## 2022-10-12 ASSESSMENT — ENCOUNTER SYMPTOMS
VOMITING: 0
NAUSEA: 0
DIARRHEA: 0

## 2022-10-12 ASSESSMENT — PAIN DESCRIPTION - LOCATION: LOCATION: FOOT

## 2022-10-12 ASSESSMENT — PAIN SCALES - GENERAL: PAINLEVEL_OUTOF10: 5

## 2022-10-12 ASSESSMENT — PAIN DESCRIPTION - ORIENTATION: ORIENTATION: LEFT

## 2022-10-12 ASSESSMENT — PAIN DESCRIPTION - FREQUENCY: FREQUENCY: INTERMITTENT

## 2022-10-12 ASSESSMENT — PAIN DESCRIPTION - PAIN TYPE: TYPE: CHRONIC PAIN

## 2022-10-12 NOTE — DISCHARGE INSTRUCTIONS
[address]                                 Visit  Discharge Instructions / Physician Orders  DATE: 10/12/22     Home Care: None     SUPPLIES ORDERED THRU:    Workers Comp. DATE LAST SUPPLIED      Wound Location:  Left anterior foot     Cleanse with: Liquid antibacterial soap and water, rinse well      Dressing Orders:  Primary dressing    silvercel rope (lay on top)  Secondary dressing      abd pad, kerlix     secure with   paper tape and ace wrap toe to knee (ace wrap on during the day, off at night)       x 30days     Frequency:  Daily     Additional Orders: Increase protein to diet (meat, cheese, eggs, fish, peanut butter, nuts and beans)  Multivitamin daily     SCHEDULE MRI ONCE APPROVED BY WORKERS COMP     OFFLOADING [x] YES  TYPE:       crutches           [] NA     Weekly wound care visits until determined otherwise. Antibiotic therapy-wound care related YES [x] NO [] NA[]     MY CHART []     Smart Device  []                  [facility] week                                                                                                   (Please note your next appointment above and if you are unable to keep, kindly give a 24 hour notice. Thank you.)  If more than 15 min late we cannot guarantee you will be seen due to clinician schedule  Per Policy, Excessive cancellation will call for dismissal from program.  If you experience any of the following, please call the [facility] during business hours:  [phone]     * Increase in Pain  * Temperature over 101  * Increase in drainage from your wound  * Drainage with a foul odor  * Bleeding  * Increase in swelling  * Need for compression bandage changes due to slippage, breakthrough drainage. If you need medical attention outside of the business hours of the [facility] please contact your PCP or go to the nearest emergency room.      The information contained in the After Visit Summary has been reviewed with me, the patient and/or responsible adult, by my health care provider(s). I had the opportunity to ask questions regarding this information.  I have elected to receive;      []After Visit Summary  [x]Comprehensive Discharge Instruction        Patient signature______________________________________Date:________  Electronically signed by Lizz Hubbard RN on 10/12/2022 at 2:09 PM   Electronically signed by Erie Brunner, DPM on 10/12/2022 at 1:08 PM

## 2022-10-12 NOTE — PLAN OF CARE
Problem: Chronic Conditions and Co-morbidities  Goal: Patient's chronic conditions and co-morbidity symptoms are monitored and maintained or improved  Outcome: Progressing     Problem: Discharge Planning  Goal: Discharge to home or other facility with appropriate resources  Outcome: Progressing     Problem: Pain  Goal: Verbalizes/displays adequate comfort level or baseline comfort level  Outcome: Progressing     Problem: Skin/Tissue Integrity - Adult  Goal: Skin integrity remains intact  Outcome: Progressing  Goal: Incisions, wounds, or drain sites healing without S/S of infection  Outcome: Progressing

## 2022-10-12 NOTE — PROGRESS NOTES
[address]   Progress Note and Procedure Note      Mercedes Scott 43 RECORD NUMBER:  [id number]  AGE: 38 y.o. GENDER: male  : 1980  EPISODE DATE:  10/12/2022    Subjective:     Chief Complaint   Patient presents with    Wound Check     Left dorsal medial foot         HISTORY of PRESENT ILLNESS HPI     Rodrigue Subramanian is a 38 y.o. male who presents today for wound/ulcer evaluation. Interval history: He notes that the redness has been improving slowly. No systemic sign of infection. States he has been using his crutches to stay off of the foot. Continues to have some difficulty in dorsiflexion of the ankle and a feeling of tightness. History of Wound Context: Patient presents with his wife today for evaluation of left foot injury. He states that last Friday while at work a large rock hit his foot and caused a laceration. He was seen in the emergency room where the laceration was repaired within a few hours. He was discharged on Keflex. He saw occupational health 2 days ago and was changed to Augmentin. States that he did have a fever a few days ago but this has resolved. States that he has had a MRSA infection in the past.    Ulcer Identification:  Ulcer Type: traumatic  Contributing Factors: diabetes          PAST MEDICAL HISTORY        Diagnosis Date    Diabetes mellitus ([address]     Hyperlipidemia     Hypertension        PAST SURGICAL HISTORY    History reviewed. No pertinent surgical history. FAMILY HISTORY    History reviewed. No pertinent family history.     SOCIAL HISTORY    Social History     Tobacco Use    Smoking status: Never    Smokeless tobacco: Never   Vaping Use    Vaping Use: Never used   Substance Use Topics    Alcohol use: Yes     Comment: ocassional    Drug use: No       ALLERGIES    No Known Allergies    MEDICATIONS    Current Outpatient Medications on File Prior to Encounter   Medication Sig Dispense Refill    doxycycline hyclate (VIBRA-TABS) 100 MG tablet Take 1 tablet by mouth 2 times daily for 7 days 14 tablet 0    amoxicillin-clavulanate (AUGMENTIN) 875-125 MG per tablet       hydroCHLOROthiazide (HYDRODIURIL) 12.5 MG tablet hydrochlorothiazide 12.5 mg tablet      insulin glargine (BASAGLAR KWIKPEN) 100 UNIT/ML injection pen Basaglar KwikPen U-100 Insulin 100 unit/mL (3 mL) subcutaneous   INJECT 60 UNITS UNDER THE SKIN ONCE DAILY      NIFEdipine (PROCARDIA XL) 90 MG extended release tablet nifedipine ER 90 mg tablet,extended release 24 hr   TAKE 1 TABLET BY MOUTH EVERY DAY FOR 90 DAYS      dapagliflozin (FARXIGA) 10 MG tablet Farxiga 10 mg tablet   TAKE 1 TABLET BY MOUTH EVERY DAY      SITagliptin (JANUVIA) 100 MG tablet Take 100 mg by mouth daily      hydrALAZINE (APRESOLINE) 100 MG tablet Take 100 mg by mouth 2 times daily      lisinopril (PRINIVIL;ZESTRIL) 5 MG tablet Take 5 mg by mouth daily      amLODIPine (NORVASC) 10 MG tablet Take 10 mg by mouth daily (Patient not taking: No sig reported)      metoprolol succinate (TOPROL XL) 25 MG extended release tablet Take 1 tablet by mouth daily (Patient not taking: No sig reported) 14 tablet 0    amLODIPine (NORVASC) 10 MG tablet Take 1 tablet by mouth daily (Patient not taking: No sig reported) 14 tablet 0    lisinopril-hydrochlorothiazide (PRINZIDE;ZESTORETIC) 20-12.5 MG per tablet Take 1 tablet by mouth 2 times daily (Patient not taking: No sig reported) 28 tablet 0    ibuprofen (ADVIL;MOTRIN) 800 MG tablet Take 1 tablet by mouth every 8 hours as needed for Pain 30 tablet 0    sitaGLIPtan-metFORMIN (JANUMET)  MG per tablet Take 1 tablet by mouth 2 times daily (with meals). (Patient not taking: Reported on 9/23/2022)      rosuvastatin (CRESTOR) 10 MG tablet Take 10 mg by mouth daily. No current facility-administered medications on file prior to encounter. REVIEW OF SYSTEMS    Review of Systems   Constitutional:  Negative for chills and fever.    Gastrointestinal: Negative for diarrhea, nausea and vomiting. Skin:  Positive for wound. Objective:      BP (!) 147/85   Pulse 97   Temp 98 °F (36.7 °C) (Tympanic)   Resp 20   Ht 6' 3\" (1.905 m)   Wt 260 lb (117.9 kg)   BMI 32.50 kg/m²     Wt Readings from Last 3 Encounters:   10/12/22 260 lb (117.9 kg)   09/29/22 260 lb (117.9 kg)   09/23/22 260 lb (117.9 kg)       Physical Exam:  General:  Alert and oriented x3. In no acute distress. Lower Extremity Physical Exam:    Vascular: DP pulses are palpable, Bilateral. PT pulses are palpable, Bilateral. CFT <3 seconds to all digits, Bilateral.  Non-pitting edema localized to the left foot and ankle mildly improved from previous. Hair growth is present to the level of the digits, Bilateral.     Neuro: Saph/sural/SP/DP/plantar sensation intact to light touch. Musculoskeletal: EHL/FHL/GS/TA gross motor intact. Gross deformity is absent. Strength is mildly decreased in dorsiflexion. Dermatologic: Laceration to the medial left foot near the ankle. Small amount of dehiscence into the dermal level. Negative probe to bone. No ascending erythema. Skin temp is equal bilateral.  There is no purulent drainage. There is no fluctuance or crepitus. Interdigital maceration absent, Bilateral.     Wound 09/29/22 Foot Anterior;Left;Medial wound #1 (Active)   Wound Image   09/29/22 0923   Wound Etiology Traumatic 10/12/22 1326   Dressing Status New drainage noted; Old drainage noted 10/12/22 1326   Wound Cleansed Cleansed with saline 10/12/22 1326   Dressing/Treatment Other (comment) 09/29/22 1009   Wound Length (cm) 3.5 cm 10/12/22 1326   Wound Width (cm) 1 cm 10/12/22 1326   Wound Depth (cm) 0.1 cm 10/12/22 1326   Wound Surface Area (cm^2) 3.5 cm^2 10/12/22 1326   Change in Wound Size % (l*w) -20.69 10/12/22 1326   Wound Volume (cm^3) 0.35 cm^3 10/12/22 1326   Wound Healing % -21 10/12/22 1326   Post-Procedure Length (cm) 3.5 cm 10/12/22 1326   Post-Procedure Width (cm) 1 cm 10/12/22 1326   Post-Procedure Depth (cm) 0.1 cm 10/12/22 1326   Post-Procedure Surface Area (cm^2) 3.5 cm^2 10/12/22 1326   Post-Procedure Volume (cm^3) 0.35 cm^3 10/12/22 1326   Wound Assessment Devitalized tissue 10/12/22 1326   Drainage Amount Moderate 10/12/22 1326   Drainage Description Serosanguinous; Yellow 10/12/22 1326   Odor None 10/12/22 1326   Vidhya-wound Assessment Blanchable erythema 10/12/22 1326   Margins Attached edges 10/12/22 1326   Wound Thickness Description not for Pressure Injury Full thickness 09/29/22 6975   Number of days: 13            Assessment:      Active Hospital Problems    Diagnosis Date Noted    Ulcer of left foot, with fat layer exposed ([facility] [address] [L97.522] 10/12/2022     Priority: Medium    Laceration of left foot [S91.312A] 09/29/2022     Priority: Medium    Contusion of left foot [S90.32XA] 09/29/2022     Priority: Medium       Plan:     Treatment Note please see attached Discharge Instructions    MRI left ankle to rule out laceration of the TA tendon due to persistent pain and weakness in dorsiflexion. Educated on signs and symptoms of infection. Instructed to call clinic immediately or go to ER if signs and symptoms of infection are present. Silvercel to the ulceration daily    Instructed to elevate above the level of the heart as much as possible  Use crutches to remain non-weightbearing to the left foot    RTC 1 week         PWritten patient discharge instructions given to patient and signed by patient or POA.       Orders Placed This Encounter   Medications    lidocaine (XYLOCAINE) 4 % external solution     Orders Placed This Encounter   Procedures    Initiate Outpatient Wound Care Protocol     Cleanse wound with saline    If wound contains bioburden or contamination cleanse with wound cleanser or antimicrobial solution     For normal periwound tissue without irritation nor maceration, apply topical skin protectant    For periwound tissue with irritation and/or maceration, apply zinc based product, topical steroid cream/ointment, or equivalent     For wounds with dry firm black eschar and/or without exudate, apply betadine and leave open to air      For wounds with scant/small to no exudate or drainage, apply wound gel, hydrocolloid, polymer, or equivalent and cover with secondary dressing/foam      For wounds with moderate/large exudate or drainage, apply alginate, hydrofiber, polymer, or equivalent and cover with secondary dressing/foam    For wounds with nonviable tissue requiring removal, apply chemical or mechanical debrider and cover with secondary dressing/foam    For wounds with tunneling, dead space, or cavity, fill or pack with strip/gauze/kerlex to fit and cover with secondary dressing/foam    For wounds with adequate granulation or epithelization, apply wound gel, hydrocolloid, polymer, collagen, or transparent film, and cover secondary dry dressing/foam    For wounds that need additional secondary dressing to help pad or control additional drainage/exudates, add foam, absorbent pad or hydrocolloid    For wounds with suspected or known infection, apply antimicrobial mesh and/or antimicrobial alginate/hydrofiber, or antimicrobial solution moistened gauze/kerlex, or equivalent and cover with secondary dressing/foam    Compression Management needed for edema control, apply multilayer compression or tubular garment or equivalent    Offloading Management needed for pressure relief, apply offloading shoe/boot or equivalent     Standing Status:   Standing     Number of Occurrences:   1    MRI ANKLE LEFT WO CONTRAST     Standing Status:   Standing     Number of Occurrences:   1     Order Specific Question:   Reason for exam:     Answer:   rule out tendon laceration, particularly TA tendon     Order Specific Question:   What is the sedation requirement?      Answer:   None            Discharge Instructions           [address] [place] and [address] [facility] Visit  Discharge Instructions / Physician Orders  DATE: 10/12/22     Home Care: None     SUPPLIES ORDERED THRU:    Workers Comp. DATE LAST SUPPLIED      Wound Location:  Left anterior foot     Cleanse with: Liquid antibacterial soap and water, rinse well      Dressing Orders:  Primary dressing    silvercel rope (lay on top)  Secondary dressing      abd pad, kerlix     secure with   paper tape and ace wrap toe to knee (ace wrap on during the day, off at night)       x 30days     Frequency:  Daily     Additional Orders: Increase protein to diet (meat, cheese, eggs, fish, peanut butter, nuts and beans)  Multivitamin daily     Schedule MRI as soon as possible     OFFLOADING [x] YES  TYPE:       crutches           [] NA     Weekly wound care visits until determined otherwise. Antibiotic therapy-wound care related YES [x] NO [] NA[]     MY CHART []     Smart Device  []                  Your next appointment with the [facility] is in 1 week                                                                                                   (Please note your next appointment above and if you are unable to keep, kindly give a 24 hour notice. Thank you.)  If more than 15 min late we cannot guarantee you will be seen due to clinician schedule  Per Policy, Excessive cancellation will call for dismissal from program.  If you experience any of the following, please call the [facility] during business hours:  [phone]     * Increase in Pain  * Temperature over 101  * Increase in drainage from your wound  * Drainage with a foul odor  * Bleeding  * Increase in swelling  * Need for compression bandage changes due to slippage, breakthrough drainage. If you need medical attention outside of the business hours of the [facility] please contact your PCP or go to the nearest emergency room.      The information contained in the After Visit Summary has been reviewed with me, the patient and/or responsible adult, by my health care provider(s). I had the opportunity to ask questions regarding this information.  I have elected to receive;      []After Visit Summary  [x]Comprehensive Discharge Instruction        Patient signature______________________________________Date:________  Electronically signed by Woody Salcedo RN on 10/12/2022 at 2:09 PM   Electronically signed by Gilford Fines, DPM on 10/12/2022 at 1:08 PM        Electronically signed by Gilford Fines, DPM on 10/12/2022 at 2:14 PM

## 2022-10-17 NOTE — DISCHARGE INSTRUCTIONS
[id number]                                 Visit  Discharge Instructions / Physician Orders  DATE: 10/19/22     Home Care: None     SUPPLIES ORDERED THRU:    Workers Comp. DATE LAST SUPPLIED      Wound Location:  Left anterior foot- healed     Cleanse with: Keep scab dry     Dressing Orders:  silvercel, abd or dry guaze, and ace wrap     Frequency:  Daily     Additional Orders: Increase protein to diet (meat, cheese, eggs, fish, peanut butter, nuts and beans)  Multivitamin daily       OFFLOADING [x] YES  TYPE:       crutches           [] NA     Weekly wound care visits until determined otherwise. Antibiotic therapy-wound care related YES [x] NO [] NA[]     MY CHART []     Smart Device  []                  Your next appointment with the [employer] [facility] is AS NEEDED                                                                                                   (Please note your next appointment above and if you are unable to keep, kindly give a 24 hour notice. Thank you.)  If more than 15 min late we cannot guarantee you will be seen due to clinician schedule  Per Policy, Excessive cancellation will call for dismissal from program.  If you experience any of the following, [facility] during business hours:  [phone]     * Increase in Pain  * Temperature over 101  * Increase in drainage from your wound  * Drainage with a foul odor  * Bleeding  * Increase in swelling  * Need for compression bandage changes due to slippage, breakthrough drainage. If you need medical attention outside of the business hours of the [facility] please contact your PCP or go to the nearest emergency room. The information contained in the After Visit Summary has been reviewed with me, the patient and/or responsible adult, by my health care provider(s). I had the opportunity to ask questions regarding this information.  I have elected to receive;      []After Visit Summary  [x]Comprehensive Discharge Instruction        Patient signature______________________________________Date:________  Electronically signed by Clinton Lewis RN on 10/19/2022 at 2:31 PM   Electronically signed by Petra Olivares DPM on 10/19/2022 at 1:37 PM

## 2022-10-19 ENCOUNTER — HOSPITAL ENCOUNTER (OUTPATIENT)
Dept: MRI IMAGING | Facility: CLINIC | Age: 42
Discharge: HOME | End: 2022-10-21
Payer: COMMERCIAL

## 2022-10-19 ENCOUNTER — HOSPITAL ENCOUNTER (OUTPATIENT)
Dept: WOUND CARE | Age: 42
Discharge: HOME | End: 2022-10-19
Payer: COMMERCIAL

## 2022-10-19 VITALS
DIASTOLIC BLOOD PRESSURE: 98 MMHG | TEMPERATURE: 98.6 F | RESPIRATION RATE: 20 BRPM | HEART RATE: 98 BPM | SYSTOLIC BLOOD PRESSURE: 134 MMHG

## 2022-10-19 DIAGNOSIS — L03.116 CELLULITIS OF LEFT FOOT: ICD-10-CM

## 2022-10-19 DIAGNOSIS — S92.255D: Primary | ICD-10-CM

## 2022-10-19 DIAGNOSIS — S91.312A LACERATION OF LEFT FOOT, INITIAL ENCOUNTER: ICD-10-CM

## 2022-10-19 DIAGNOSIS — S91.012A LACERATION OF LEFT ANKLE, INITIAL ENCOUNTER: ICD-10-CM

## 2022-10-19 DIAGNOSIS — S90.32XD CONTUSION OF LEFT FOOT, SUBSEQUENT ENCOUNTER: ICD-10-CM

## 2022-10-19 PROCEDURE — 73721 MRI JNT OF LWR EXTRE W/O DYE: CPT

## 2022-10-19 PROCEDURE — 99212 OFFICE O/P EST SF 10 MIN: CPT

## 2022-10-19 RX ORDER — CLOBETASOL PROPIONATE 0.5 MG/G
OINTMENT TOPICAL ONCE
Status: CANCELLED | OUTPATIENT
Start: 2022-10-19 | End: 2022-10-19

## 2022-10-19 RX ORDER — BACITRACIN ZINC AND POLYMYXIN B SULFATE 500; 1000 [USP'U]/G; [USP'U]/G
OINTMENT TOPICAL ONCE
Status: CANCELLED | OUTPATIENT
Start: 2022-10-19 | End: 2022-10-19

## 2022-10-19 RX ORDER — LIDOCAINE HYDROCHLORIDE 40 MG/ML
SOLUTION TOPICAL ONCE
Status: COMPLETED | OUTPATIENT
Start: 2022-10-19 | End: 2022-10-19

## 2022-10-19 RX ORDER — LIDOCAINE 40 MG/G
CREAM TOPICAL ONCE
Status: CANCELLED | OUTPATIENT
Start: 2022-10-19 | End: 2022-10-19

## 2022-10-19 RX ORDER — GENTAMICIN SULFATE 1 MG/G
OINTMENT TOPICAL ONCE
Status: CANCELLED | OUTPATIENT
Start: 2022-10-19 | End: 2022-10-19

## 2022-10-19 RX ORDER — BETAMETHASONE DIPROPIONATE 0.05 %
OINTMENT (GRAM) TOPICAL ONCE
Status: CANCELLED | OUTPATIENT
Start: 2022-10-19 | End: 2022-10-19

## 2022-10-19 RX ORDER — LIDOCAINE HYDROCHLORIDE 40 MG/ML
SOLUTION TOPICAL ONCE
Status: CANCELLED | OUTPATIENT
Start: 2022-10-19 | End: 2022-10-19

## 2022-10-19 RX ORDER — BACITRACIN, NEOMYCIN, POLYMYXIN B 400; 3.5; 5 [USP'U]/G; MG/G; [USP'U]/G
OINTMENT TOPICAL ONCE
Status: CANCELLED | OUTPATIENT
Start: 2022-10-19 | End: 2022-10-19

## 2022-10-19 RX ORDER — LIDOCAINE 50 MG/G
OINTMENT TOPICAL ONCE
Status: CANCELLED | OUTPATIENT
Start: 2022-10-19 | End: 2022-10-19

## 2022-10-19 RX ORDER — LIDOCAINE HYDROCHLORIDE 20 MG/ML
JELLY TOPICAL ONCE
Status: CANCELLED | OUTPATIENT
Start: 2022-10-19 | End: 2022-10-19

## 2022-10-19 RX ORDER — GINSENG 100 MG
CAPSULE ORAL ONCE
Status: CANCELLED | OUTPATIENT
Start: 2022-10-19 | End: 2022-10-19

## 2022-10-19 RX ADMIN — LIDOCAINE HYDROCHLORIDE 5 ML: 40 SOLUTION TOPICAL at 14:04

## 2022-10-19 ASSESSMENT — ENCOUNTER SYMPTOMS
NAUSEA: 0
DIARRHEA: 0
VOMITING: 0

## 2022-10-19 NOTE — PROGRESS NOTES
[address]   Progress Note and Procedure Note      Mercedes Scott 43 RECORD NUMBER:  [id number]  AGE: 38 y.o. GENDER: male  : 1980  EPISODE DATE:  10/19/2022    Subjective:     Chief Complaint   Patient presents with    Wound Check     Left foot         HISTORY of PRESENT ILLNESS HPI     Cathleen Rose is a 38 y.o. male who presents today for wound/ulcer evaluation. Current evaluation:  He had the MRI completed which revealed non-displaced fracture of the navicular. He has remained NWB with crutches. Ulceration has closed. No sign or symptom of infection. Interval history: He notes that the redness has been improving slowly. No systemic sign of infection. States he has been using his crutches to stay off of the foot. Continues to have some difficulty in dorsiflexion of the ankle and a feeling of tightness. History of Wound Context: Patient presents with his wife today for evaluation of left foot injury. He states that last Friday while at work a large rock hit his foot and caused a laceration. He was seen in the emergency room where the laceration was repaired within a few hours. He was discharged on Keflex. He saw occupational health 2 days ago and was changed to Augmentin. States that he did have a fever a few days ago but this has resolved. States that he has had a MRSA infection in the past.    Ulcer Identification:  Ulcer Type: traumatic  Contributing Factors: diabetes          PAST MEDICAL HISTORY        Diagnosis Date    Diabetes mellitus ([address]     Hyperlipidemia     Hypertension        PAST SURGICAL HISTORY    No past surgical history on file. FAMILY HISTORY    No family history on file.     SOCIAL HISTORY    Social History     Tobacco Use    Smoking status: Never    Smokeless tobacco: Never   Vaping Use    Vaping Use: Never used   Substance Use Topics    Alcohol use: Yes     Comment: ocassional    Drug use: No       ALLERGIES    No Known Allergies    MEDICATIONS    Current Outpatient Medications on File Prior to Encounter   Medication Sig Dispense Refill    amoxicillin-clavulanate (AUGMENTIN) 875-125 MG per tablet       hydroCHLOROthiazide (HYDRODIURIL) 12.5 MG tablet hydrochlorothiazide 12.5 mg tablet      insulin glargine (BASAGLAR KWIKPEN) 100 UNIT/ML injection pen Basaglar KwikPen U-100 Insulin 100 unit/mL (3 mL) subcutaneous   INJECT 60 UNITS UNDER THE SKIN ONCE DAILY      NIFEdipine (PROCARDIA XL) 90 MG extended release tablet nifedipine ER 90 mg tablet,extended release 24 hr   TAKE 1 TABLET BY MOUTH EVERY DAY FOR 90 DAYS      dapagliflozin (FARXIGA) 10 MG tablet Farxiga 10 mg tablet   TAKE 1 TABLET BY MOUTH EVERY DAY      SITagliptin (JANUVIA) 100 MG tablet Take 100 mg by mouth daily      hydrALAZINE (APRESOLINE) 100 MG tablet Take 100 mg by mouth 2 times daily      lisinopril (PRINIVIL;ZESTRIL) 5 MG tablet Take 5 mg by mouth daily      amLODIPine (NORVASC) 10 MG tablet Take 10 mg by mouth daily (Patient not taking: No sig reported)      metoprolol succinate (TOPROL XL) 25 MG extended release tablet Take 1 tablet by mouth daily (Patient not taking: No sig reported) 14 tablet 0    amLODIPine (NORVASC) 10 MG tablet Take 1 tablet by mouth daily (Patient not taking: No sig reported) 14 tablet 0    lisinopril-hydrochlorothiazide (PRINZIDE;ZESTORETIC) 20-12.5 MG per tablet Take 1 tablet by mouth 2 times daily (Patient not taking: No sig reported) 28 tablet 0    ibuprofen (ADVIL;MOTRIN) 800 MG tablet Take 1 tablet by mouth every 8 hours as needed for Pain 30 tablet 0    sitaGLIPtan-metFORMIN (JANUMET)  MG per tablet Take 1 tablet by mouth 2 times daily (with meals). (Patient not taking: Reported on 9/23/2022)      rosuvastatin (CRESTOR) 10 MG tablet Take 10 mg by mouth daily. No current facility-administered medications on file prior to encounter.        REVIEW OF SYSTEMS    Review of Systems   Constitutional:  Negative for chills and fever. Gastrointestinal:  Negative for diarrhea, nausea and vomiting. Skin:  Positive for wound. Objective:      BP (!) 134/98   Pulse 98   Temp 98.6 °F (37 °C) (Tympanic)   Resp 20     Wt Readings from Last 3 Encounters:   10/19/22 260 lb (117.9 kg)   10/12/22 260 lb (117.9 kg)   09/29/22 260 lb (117.9 kg)       Physical Exam:  General:  Alert and oriented x3. In no acute distress. Lower Extremity Physical Exam:    Vascular: DP pulses are palpable, Bilateral. PT pulses are palpable, Bilateral. CFT <3 seconds to all digits, Bilateral.  Non-pitting edema localized to the left foot and ankle mildly improved from previous. Hair growth is present to the level of the digits, Bilateral.     Neuro: Saph/sural/SP/DP/plantar sensation intact to light touch. Musculoskeletal: EHL/FHL/GS/TA gross motor intact. Gross deformity is absent. Strength is mildly decreased in dorsiflexion. TTP at the level of the navicular. Dermatologic: Ulceration has healed with dry scab. No erythema. Skin temp is equal bilateral.  There is no drainage. There is no fluctuance or crepitus. Interdigital maceration absent, Bilateral.     Wound 09/29/22 Foot Anterior;Left;Medial wound #1 (Active)   Wound Image   10/19/22 1400   Wound Etiology Traumatic 10/19/22 1400   Dressing Status New drainage noted; Old drainage noted 10/19/22 1400   Wound Cleansed Not Cleansed 10/19/22 1400   Dressing/Treatment Other (comment) 10/12/22 1427   Wound Length (cm) 3.2 cm 10/19/22 1400   Wound Width (cm) 1 cm 10/19/22 1400   Wound Depth (cm) 0.1 cm 10/19/22 1400   Wound Surface Area (cm^2) 3.2 cm^2 10/19/22 1400   Change in Wound Size % (l*w) -10.34 10/19/22 1400   Wound Volume (cm^3) 0.32 cm^3 10/19/22 1400   Wound Healing % -10 10/19/22 1400   Post-Procedure Length (cm) 3.5 cm 10/12/22 1326   Post-Procedure Width (cm) 1 cm 10/12/22 1326   Post-Procedure Depth (cm) 0.1 cm 10/12/22 1326   Post-Procedure Surface Area (cm^2) 3.5 cm^2 10/12/22 1326   Post-Procedure Volume (cm^3) 0.35 cm^3 10/12/22 1326   Wound Assessment Eschar dry 10/19/22 1400   Drainage Amount Moderate 10/19/22 1400   Drainage Description Serosanguinous; Yellow 10/19/22 1400   Odor None 10/19/22 1400   Vidhya-wound Assessment Blanchable erythema 10/19/22 1400   Margins Attached edges 10/19/22 1400   Wound Thickness Description not for Pressure Injury Full thickness 09/29/22 0923   Number of days: 20            Assessment:      Active Hospital Problems    Diagnosis Date Noted    Open nondisplaced fracture of navicular bone of left foot with routine healing [S92.255D] 10/19/2022     Priority: Medium    Ulcer of left foot, with fat layer exposed ([address] [L97.522] 10/12/2022     Priority: Medium    Laceration of left foot [S91.312A] 09/29/2022     Priority: Medium    Contusion of left foot [S90.32XA] 09/29/2022     Priority: Medium       Plan:     Treatment Note please see attached Discharge Instructions    MRI left ankle reviewed and discussed with the patient and his significant other. Recommend continued NWB to the left LE with crutches due to non-displaced fracture of the navicular. May require cast or CAM boot for immobilization. Referral placed to Dr. Sher Driver. Instructed to elevate above the level of the heart as much as possible  Use crutches to remain non-weightbearing to the left foot    RTC 1 week         PWritten patient discharge instructions given to patient and signed by patient or POA.       Orders Placed This Encounter   Medications    lidocaine (XYLOCAINE) 4 % external solution     Orders Placed This Encounter   Procedures    Initiate Outpatient Wound Care Protocol     Cleanse wound with saline    If wound contains bioburden or contamination cleanse with wound cleanser or antimicrobial solution     For normal periwound tissue without irritation nor maceration, apply topical skin protectant    For periwound tissue with irritation and/or maceration, apply zinc based product, topical steroid cream/ointment, or equivalent     For wounds with dry firm black eschar and/or without exudate, apply betadine and leave open to air      For wounds with scant/small to no exudate or drainage, apply wound gel, hydrocolloid, polymer, or equivalent and cover with secondary dressing/foam      For wounds with moderate/large exudate or drainage, apply alginate, hydrofiber, polymer, or equivalent and cover with secondary dressing/foam    For wounds with nonviable tissue requiring removal, apply chemical or mechanical debrider and cover with secondary dressing/foam    For wounds with tunneling, dead space, or cavity, fill or pack with strip/gauze/kerlex to fit and cover with secondary dressing/foam    For wounds with adequate granulation or epithelization, apply wound gel, hydrocolloid, polymer, collagen, or transparent film, and cover secondary dry dressing/foam    For wounds that need additional secondary dressing to help pad or control additional drainage/exudates, add foam, absorbent pad or hydrocolloid    For wounds with suspected or known infection, apply antimicrobial mesh and/or antimicrobial alginate/hydrofiber, or antimicrobial solution moistened gauze/kerlex, or equivalent and cover with secondary dressing/foam    Compression Management needed for edema control, apply multilayer compression or tubular garment or equivalent    Offloading Management needed for pressure relief, apply offloading shoe/boot or equivalent     Standing Status:   Standing     Number of Occurrences:   1            Discharge Instructions         [address] and [address] [facility]                                 Visit  Discharge Instructions / Physician Orders  DATE: 10/19/22     Home Care: None     SUPPLIES ORDERED THRU:    Workers Comp.                  DATE LAST SUPPLIED      Wound Location:  Left anterior foot- healed     Cleanse with: Keep scab dry     Dressing Orders:  silvercel, abd or dry guaze, and ace wrap     Frequency:  Daily     Additional Orders: Increase protein to diet (meat, cheese, eggs, fish, peanut butter, nuts and beans)  Multivitamin daily       OFFLOADING [x] YES  TYPE:       crutches           [] NA     Weekly wound care visits until determined otherwise. Antibiotic therapy-wound care related YES [x] NO [] NA[]     MY CHART []     Smart Device  []                  Your next appointment with the [facility] is AS NEEDED                                                                                                   (Please note your next appointment above and if you are unable to keep, kindly give a 24 hour notice. Thank you.)  If more than 15 min late we cannot guarantee you will be seen due to clinician schedule  Per Policy, Excessive cancellation will call for dismissal from program.  If you experience any of the following, please call the [facility] during business hours:  [phone]     * Increase in Pain  * Temperature over 101  * Increase in drainage from your wound  * Drainage with a foul odor  * Bleeding  * Increase in swelling  * Need for compression bandage changes due to slippage, breakthrough drainage. If you need medical attention outside of the business hours of the [facility] please contact your PCP or go to the nearest emergency room. The information contained in the After Visit Summary has been reviewed with me, the patient and/or responsible adult, by my health care provider(s). I had the opportunity to ask questions regarding this information.  I have elected to receive;      []After Visit Summary  [x]Comprehensive Discharge Instruction        Patient signature______________________________________Date:________  Electronically signed by Juan J Puckett DPM on 10/19/2022 at 1:37 PM        Electronically signed by Juan J Puckett DPM on 10/19/2022 at 2:24 PM

## 2022-10-24 ENCOUNTER — OFFICE VISIT (OUTPATIENT)
Dept: PODIATRY | Age: 42
End: 2022-10-24
Payer: COMMERCIAL

## 2022-10-24 VITALS — BODY MASS INDEX: 32.33 KG/M2 | WEIGHT: 260 LBS | HEIGHT: 75 IN

## 2022-10-24 DIAGNOSIS — S91.012A LACERATION OF LEFT ANKLE, INITIAL ENCOUNTER: ICD-10-CM

## 2022-10-24 DIAGNOSIS — S90.32XA CONTUSION OF LEFT FOOT, INITIAL ENCOUNTER: ICD-10-CM

## 2022-10-24 DIAGNOSIS — S92.255D: ICD-10-CM

## 2022-10-24 DIAGNOSIS — S91.312A LACERATION OF LEFT FOOT, INITIAL ENCOUNTER: Primary | ICD-10-CM

## 2022-10-24 DIAGNOSIS — L97.522 ULCER OF LEFT FOOT, WITH FAT LAYER EXPOSED (HCC): ICD-10-CM

## 2022-10-24 DIAGNOSIS — L03.116 CELLULITIS OF LEFT FOOT: ICD-10-CM

## 2022-10-24 PROCEDURE — 25622 CLTX CARPL SCPHD FX W/O MNPJ: CPT | Performed by: PODIATRIST

## 2022-10-24 PROCEDURE — L4361 PNEUMA/VAC WALK BOOT PRE OTS: HCPCS | Performed by: PODIATRIST

## 2022-10-24 PROCEDURE — 99204 OFFICE O/P NEW MOD 45 MIN: CPT | Performed by: PODIATRIST

## 2022-10-24 NOTE — PROGRESS NOTES
[address] [facility] PODIATRY [facility]  [address] [address] [address]  Dept: [phone]  Dept Fax: [phone]    NEW PATIENT PROGRESS NOTE  Date of patient's visit: 10/24/2022  Patient's Name:  Cathy Pastor YOB: 1980            Patient Care Team:  Flip Aviles MD as PCP - General        Chief Complaint   Patient presents with    New Patient    Foot Injury     Left foot injury on 9/23/22 with wound and fracture         HPI:   Cathy Pastor is a 38 y.o. male who presents to the office today complaining of left foot injury. Symptoms began 1 month(s) ago. Patient relates pain is Present. Pain is rated 4 out of 10 and is described as constant, moderate, severe. Treatments prior to today's visit include: wound care for left foot ulcer. Currently denies F/C/N/V. Pt's primary care physician is Flip Aviles MD last seen 09/30/2022     No Known Allergies    Past Medical History:   Diagnosis Date    Diabetes mellitus ([facility]     Hyperlipidemia     Hypertension        Prior to Admission medications    Medication Sig Start Date End Date Taking?  Authorizing Provider   hydroCHLOROthiazide (HYDRODIURIL) 12.5 MG tablet hydrochlorothiazide 12.5 mg tablet   Yes Historical Provider, MD   insulin glargine (BASAGLAR KWIKPEN) 100 UNIT/ML injection pen Basaglar KwikPen U-100 Insulin 100 unit/mL (3 mL) subcutaneous   INJECT 60 UNITS UNDER THE SKIN ONCE DAILY   Yes Historical Provider, MD   NIFEdipine (PROCARDIA XL) 90 MG extended release tablet nifedipine ER 90 mg tablet,extended release 24 hr   TAKE 1 TABLET BY MOUTH EVERY DAY FOR 90 DAYS   Yes Historical Provider, MD   dapagliflozin (FARXIGA) 10 MG tablet Farxiga 10 mg tablet   TAKE 1 TABLET BY MOUTH EVERY DAY   Yes Historical Provider, MD   SITagliptin (JANUVIA) 100 MG tablet Take 100 mg by mouth daily   Yes Historical Provider, MD   hydrALAZINE (APRESOLINE) 100 MG tablet Take 100 mg by mouth 2 times daily   Yes Historical Provider, MD   lisinopril (PRINIVIL;ZESTRIL) 5 MG tablet Take 5 mg by mouth daily   Yes Historical Provider, MD   ibuprofen (ADVIL;MOTRIN) 800 MG tablet Take 1 tablet by mouth every 8 hours as needed for Pain 3/28/17  Yes Katelin Espino PA-C   rosuvastatin (CRESTOR) 10 MG tablet Take 10 mg by mouth daily. Yes Historical Provider, MD   amoxicillin-clavulanate (AUGMENTIN) 875-125 MG per tablet  9/28/22   Historical Provider, MD   amLODIPine (NORVASC) 10 MG tablet Take 10 mg by mouth daily  Patient not taking: No sig reported    Historical Provider, MD   metoprolol succinate (TOPROL XL) 25 MG extended release tablet Take 1 tablet by mouth daily  Patient not taking: No sig reported 9/29/17   KAREN Singh - CNP   amLODIPine (NORVASC) 10 MG tablet Take 1 tablet by mouth daily  Patient not taking: No sig reported 9/29/17   KAREN Singh - CNP   lisinopril-hydrochlorothiazide (PRINZIDE;ZESTORETIC) 20-12.5 MG per tablet Take 1 tablet by mouth 2 times daily  Patient not taking: No sig reported 9/29/17   Neeta Monae APRN - CNP   sitaGLIPtan-metFORMIN (JANUMET)  MG per tablet Take 1 tablet by mouth 2 times daily (with meals). Patient not taking: No sig reported    Historical Provider, MD       No past surgical history on file. No family history on file. Social History     Tobacco Use    Smoking status: Never    Smokeless tobacco: Never   Substance Use Topics    Alcohol use: Yes     Comment: ocassional       Review of Systems    Review of Systems:   History obtained from chart review and the patient  General ROS: negative for - chills, fatigue, fever, night sweats or weight gain  Constitutional: Negative for chills, diaphoresis, fatigue, fever and unexpected weight change. Musculoskeletal: Positive for arthralgias, gait problem and joint swelling. Neurological ROS: negative for - behavioral changes, confusion, headaches or seizures.  Negative for weakness and numbness. Dermatological ROS: negative for - mole changes, rash  Cardiovascular: Negative for leg swelling. Gastrointestinal: Negative for constipation, diarrhea, nausea and vomiting. Lower Extremity Physical Examination:   Vitals: There were no vitals filed for this visit. General: AAO x 3 in NAD. Dermatologic Exam:  Ulcer noted to dorsal medial foot with dry eschar. No drainage noted. Musculoskeletal:     1st MPJ ROM decreased, Bilateral.  Muscle strength 5/5, Bilateral.  Pain present upon palpation of left foot along medial foot at the level of navicular. Medial longitudinal arch, Bilateral WNL. Ankle ROM WNL,Bilateral.    Dorsally contracted digits absent digits 1-5 Bilateral.     Vascular: DP and PT pulses palpable 2/4, Bilateral.  CFT <3 seconds, Bilateral.  Hair growth present to the level of the digits, Bilateral.  Edema absent, Bilateral.  Varicosities absent, Bilateral. Erythema absent, Bilateral    Neurological: Sensation intact to light touch to level of digits, Bilateral.  Protective sensation intact 10/10 sites via 5.07/10g Durham-Curt Monofilament, Bilateral.  negative Tinel's, Bilateral.  negative Valleix sign, Bilateral.      Integument: Warm, dry, supple, Bilateral.  Open lesion absent, Bilateral.  Interdigital maceration absent to web spaces 1-4, Bilateral.  Nails are normal in length, thickness and color 1-5 bilateral.  Fissures absent, Bilateral.       MRI ankle foot:  Occult fracture of the navicular with adjacent soft tissue edema. Consider   further evaluation with CT. Multifocal patchy marrow edema of the midfoot may be reactive, contusions, or   due to changes in repetitive mechanical forces. Partial tearing of the anterior talofibular ligament. Low-grade sprain of   the spring ligament. Tendinosis of the posterior tibialis. Asessment: Patient is a 38 y.o. male with:    Diagnosis Orders   1.  Laceration of left foot, initial encounter        2. Contusion of left foot, initial encounter        3. Ulcer of left foot, with fat layer exposed ([address]  IN PNEUMA/VAC WALK BOOT PRE OTS      4. Open nondisplaced fracture of navicular bone of left foot with routine healing  IN PNEUMA/VAC WALK BOOT PRE OTS      5. Laceration of left ankle, initial encounter        6. Cellulitis of left foot              Plan: Patient examined and evaluated. Current condition and treatment options discussed in detail. Discussed conservative and surgical options with the patient. Reviewed and discussed MRI with above findings. At this time surgery is not recommended as there is minimal displacement. I have dispensed a pneumatic equalizer walker. Due to the patient's diagnosis and related symptoms this is medically necessary for the treatment. The function of this device is to restrict and limit motion and provide stabilization and compression to the affected area. This device will allow the patient to slowly increase strength and ROM of the extremity. Specific instructions on weight bearing and ROM exercises were discussed and given to the patient. The goals and function of this device was explained in detail to the patient. Upon gait analysis, the device appeared to be fitting well and the patient states that the device is comfortable at this time. The patient was shown how to properly apply, wear, and care for the device. The patient was able to apply properly and ambulate without distress. At that time, the device was  dispensed, it was suitable for the patient's condition and was not substandard. No guarantees were given and the precautions were reviewed. Written instructions and warranty information was given along with the list of the twenty-one (21) Durable Medical Equipment Supplier Guidelines. All the questions were answered satisfactorily    Advised pt to apply DSD to wound once daily using silvercell and DSD. Monitor for infection.   Verbal and written instructions given to patient. Contact office with any questions/problems/concerns. RTC in 3week(s).     10/24/2022    Electronically signed by Chante Tomlinson DPM on 10/24/2022 at 9:01 AM  10/24/2022

## 2022-11-07 DIAGNOSIS — S92.255A CLOSED NONDISPLACED FRACTURE OF NAVICULAR BONE OF LEFT FOOT, INITIAL ENCOUNTER: Primary | ICD-10-CM

## 2022-11-09 ENCOUNTER — HOSPITAL ENCOUNTER (OUTPATIENT)
Dept: CT IMAGING | Age: 42
Discharge: HOME | End: 2022-11-11
Payer: COMMERCIAL

## 2022-11-09 DIAGNOSIS — S92.255A CLOSED NONDISPLACED FRACTURE OF NAVICULAR BONE OF LEFT FOOT, INITIAL ENCOUNTER: ICD-10-CM

## 2022-11-09 PROCEDURE — 73700 CT LOWER EXTREMITY W/O DYE: CPT

## 2022-11-16 ENCOUNTER — OFFICE VISIT (OUTPATIENT)
Dept: PODIATRY | Age: 42
End: 2022-11-16
Payer: COMMERCIAL

## 2022-11-16 DIAGNOSIS — S92.255D: ICD-10-CM

## 2022-11-16 DIAGNOSIS — M79.605 PAIN OF LEFT LOWER EXTREMITY: ICD-10-CM

## 2022-11-16 DIAGNOSIS — S92.255A CLOSED NONDISPLACED FRACTURE OF NAVICULAR BONE OF LEFT FOOT, INITIAL ENCOUNTER: Primary | ICD-10-CM

## 2022-11-16 DIAGNOSIS — L97.522 ULCER OF LEFT FOOT, WITH FAT LAYER EXPOSED (HCC): ICD-10-CM

## 2022-11-16 PROCEDURE — 99213 OFFICE O/P EST LOW 20 MIN: CPT | Performed by: PODIATRIST

## 2022-11-16 RX ORDER — PEN NEEDLE, DIABETIC 31 GX5/16"
NEEDLE, DISPOSABLE MISCELLANEOUS
COMMUNITY
Start: 2022-10-24

## 2022-11-16 RX ORDER — LANCETS 33 GAUGE
EACH MISCELLANEOUS
COMMUNITY
Start: 2022-11-14

## 2022-11-16 RX ORDER — BLOOD SUGAR DIAGNOSTIC
STRIP MISCELLANEOUS
COMMUNITY
Start: 2022-11-14

## 2022-11-17 ENCOUNTER — TELEPHONE (OUTPATIENT)
Dept: PODIATRY | Age: 42
End: 2022-11-17

## 2022-11-17 NOTE — TELEPHONE ENCOUNTER
Pt called today with more questions/concerns about CT review from previous office visit;    Fragment causing stiffness- treatment options? Pt states he would like this documentation because this is a workers comp claim. Pt states he would like this addressed prior to his next appointment in 3 weeks.

## 2022-11-21 ENCOUNTER — OFFICE VISIT (OUTPATIENT)
Dept: PODIATRY | Age: 42
End: 2022-11-21
Payer: COMMERCIAL

## 2022-11-21 VITALS — HEIGHT: 75 IN | BODY MASS INDEX: 32.33 KG/M2 | WEIGHT: 260 LBS

## 2022-11-21 DIAGNOSIS — R60.0 EDEMA OF LOWER EXTREMITY: ICD-10-CM

## 2022-11-21 DIAGNOSIS — S92.255A CLOSED NONDISPLACED FRACTURE OF NAVICULAR BONE OF LEFT FOOT, INITIAL ENCOUNTER: Primary | ICD-10-CM

## 2022-11-21 PROCEDURE — 99213 OFFICE O/P EST LOW 20 MIN: CPT | Performed by: PODIATRIST

## 2022-11-21 RX ORDER — TRAMADOL HYDROCHLORIDE 50 MG/1
50 TABLET ORAL EVERY 6 HOURS PRN
Qty: 28 TABLET | Refills: 0 | Status: SHIPPED | OUTPATIENT
Start: 2022-11-21 | End: 2022-11-24

## 2022-11-21 NOTE — PROGRESS NOTES
[address] [facility] [address] [address]  Dept: [phone]  Dept Fax: [phone]    RETURN PATIENT PROGRESS NOTE  Date of patient's visit: 11/21/2022  Patient's Name:  Livan Mcallister YOB: 1980            Patient Care Team:  Lani Herman MD as PCP - General  Jessica Son DPM as Physician (Podiatry)       Livan Mcallister 38 y.o. male that presents for follow-up of   Chief Complaint   Patient presents with    Foot Pain     Left foot and ankle     Pt's primary care physician is Lani Herman MD last seen 09/30/2022  Symptoms began 2 month(s) ago and are unchanged . Patient relates pain is Present to left foot and ankle. He relates he is unable to walk on the left foot. Pain is rated 4 out of 10 and is described as constant, mild, moderate, severe. Treatments prior to today's visit include: CAM boot. Currently denies F/C/N/V. No Known Allergies    Past Medical History:   Diagnosis Date    Diabetes mellitus ([facility] [address]     Hyperlipidemia     Hypertension        Prior to Admission medications    Medication Sig Start Date End Date Taking?  Authorizing Provider   Lancets (Bernadette Reardon PLUS [id number]) [address] [facility]  11/14/22  Yes Historical Provider, MD LAUREANO ULTRAFINE III SHORT PEN 31G X 8 MM MISC USE AS DIRECTED 10/24/22  Yes Historical Provider, MD Dotty christianson  11/14/22  Yes Historical Provider, MD   hydroCHLOROthiazide (HYDRODIURIL) 12.5 MG tablet hydrochlorothiazide 12.5 mg tablet   Yes Historical Provider, MD   insulin glargine (BASAGLAR KWIKPEN) 100 UNIT/ML injection pen Basaglar KwikPen U-100 Insulin 100 unit/mL (3 mL) subcutaneous   INJECT 60 UNITS UNDER THE SKIN ONCE DAILY   Yes Historical Provider, MD   NIFEdipine (PROCARDIA XL) 90 MG extended release tablet nifedipine ER 90 mg tablet,extended release 24 hr   TAKE 1 TABLET BY MOUTH EVERY DAY FOR 90 DAYS   Yes Historical Provider, MD   dapagliflozin (FARXIGA) 10 MG tablet Farxiga 10 mg tablet   TAKE 1 TABLET BY MOUTH EVERY DAY   Yes Historical Provider, MD   SITagliptin (JANUVIA) 100 MG tablet Take 100 mg by mouth daily   Yes Historical Provider, MD   hydrALAZINE (APRESOLINE) 100 MG tablet Take 100 mg by mouth 2 times daily   Yes Historical Provider, MD   lisinopril (PRINIVIL;ZESTRIL) 5 MG tablet Take 5 mg by mouth daily   Yes Historical Provider, MD   ibuprofen (ADVIL;MOTRIN) 800 MG tablet Take 1 tablet by mouth every 8 hours as needed for Pain 3/28/17  Yes Miquel Mcgrath PA-C   rosuvastatin (CRESTOR) 10 MG tablet Take 10 mg by mouth daily. Yes Historical Provider, MD   amoxicillin-clavulanate (AUGMENTIN) 875-125 MG per tablet  9/28/22   Historical Provider, MD   amLODIPine (NORVASC) 10 MG tablet Take 10 mg by mouth daily  Patient not taking: No sig reported    Historical Provider, MD   metoprolol succinate (TOPROL XL) 25 MG extended release tablet Take 1 tablet by mouth daily  Patient not taking: No sig reported 9/29/17   KAREN Dias CNP   amLODIPine (NORVASC) 10 MG tablet Take 1 tablet by mouth daily  Patient not taking: No sig reported 9/29/17   KAREN Dias CNP   lisinopril-hydrochlorothiazide (PRINZIDE;ZESTORETIC) 20-12.5 MG per tablet Take 1 tablet by mouth 2 times daily  Patient not taking: No sig reported 9/29/17   KAREN Dias CNP   sitaGLIPtan-metFORMIN (JANUMET)  MG per tablet Take 1 tablet by mouth 2 times daily (with meals). Patient not taking: No sig reported    Historical Provider, MD       Review of Systems    Review of Systems:  History obtained from chart review and the patient  General ROS: negative for - chills, fatigue, fever, night sweats or weight gain  Constitutional: Negative for chills, diaphoresis, fatigue, fever and unexpected weight change. Musculoskeletal: Positive for arthralgias, gait problem and joint swelling.   Neurological ROS: negative for - behavioral changes, confusion, headaches or seizures. Negative for weakness and numbness. Dermatological ROS: negative for - mole changes, rash  Cardiovascular: Negative for leg swelling. Gastrointestinal: Negative for constipation, diarrhea, nausea and vomiting. Lower Extremity Physical Examination:     Vitals: There were no vitals filed for this visit. General: AAO x 3 in NAD. Dermatologic Exam:  Ulcer noted to dorsal medial foot with dry eschar. No drainage noted. Musculoskeletal:     1st MPJ ROM decreased, Bilateral.  Muscle strength 5/5, Bilateral.  Pain present upon palpation of left foot along medial foot at the level of navicular. Medial longitudinal arch, Bilateral WNL. Ankle ROM limited, left foot. Guarded ROM to left ankle. Dorsally contracted digits absent digits 1-5 Bilateral.      Vascular: DP and PT pulses palpable 2/4, Bilateral.  CFT <3 seconds, Bilateral.  Hair growth present to the level of the digits, Bilateral.  Edema absent, Bilateral.  Varicosities absent, Bilateral. Erythema absent, Bilateral     Neurological: Sensation intact to light touch to level of digits, Bilateral.  Protective sensation intact 10/10 sites via 5.07/10g Dierks-Curt Monofilament, Bilateral.  negative Tinel's, Bilateral.  negative Valleix sign, Bilateral.       Integument: Warm, dry, supple, Bilateral.  Open lesion absent, Bilateral.  Interdigital maceration absent to web spaces 1-4, Bilateral.  Nails are normal in length, thickness and color 1-5 bilateral.  Fissures absent, Bilateral.     Asessment: Patient is a 38 y.o. male with:    Diagnosis Orders   1. Closed nondisplaced fracture of navicular bone of left foot, initial encounter  traMADol (ULTRAM) 50 MG tablet      2. Edema of lower extremity  traMADol (ULTRAM) 50 MG tablet          Plan: Patient examined and evaluated. Current condition and treatment options discussed in detail. Advised pt to remain in CAM boot if pain worsens or persists.  Recommend pt to do light passive ROM at home to left ankle. Keep wound clean and dry. Apply DSD to wound once daily and monitor for increased SOI. Pt may ice and elevate at home. At this time we recommend patient to return to work on 12-. All labs were reviewed and all imagining including the above findings were reviewed PRIOR to the patients arrival and with the patient today. Previous patient encounter was reviewed. Encounters from the patients other medical providers were reviewed and noted. Time was spent educating the patient on proper care of the feet and ankles. All the above diagnosis were addressed at todays visit and all questions were answered. A total of 25 minutes was spent with this patients encounter which included charting after the patients visit    . Verbal and written instructions given to patient. Contact office with any questions/problems/concerns. No orders of the defined types were placed in this encounter. No orders of the defined types were placed in this encounter. RTC in 3week(s).     11/21/2022      Electronically signed by Doug Arnold DPM on 11/21/2022 at 1:06 PM  11/21/2022

## 2022-11-28 NOTE — PROGRESS NOTES
[address] [facility]  [address] [facility] [address] [facility] [address]  Dept: [phone]  Dept Fax: [phone]    NEW PATIENT PROGRESS NOTE  Date of patient's visit: 11/28/2022  Patient's Name:  Danyel Rosales YOB: 1980            Patient Care Team:  Jevon Colon MD as PCP - General Floridalma Keys DPM as Physician (Podiatry)        Chief Complaint   Patient presents with    Foot Injury     3 week follow up Left foot injury on 9/23/22 with wound and fracture         HPI:   Danyel Rosales is a 38 y.o. male who presents to the office today complaining of left foot injury. Symptoms began 1 month(s) ago. Patient relates pain is Present. Pain is rated 4 out of 10 and is described as constant, moderate, severe. Treatments prior to today's visit include: wound care for left foot ulcer. Currently denies F/C/N/V. Pt's primary care physician is Jevon Colon MD last seen 09/30/2022     No Known Allergies    Past Medical History:   Diagnosis Date    Diabetes mellitus ([facility] [address]     Hyperlipidemia     Hypertension        Prior to Admission medications    Medication Sig Start Date End Date Taking?  Authorizing Provider   Lancets (Codie Weller PLUS [id number]) [address] [facility]  11/14/22  Yes Historical Provider, MD LAUREANO ULTRAFINE III SHORT PEN 31G X 8 MM MISC USE AS DIRECTED 10/24/22  Yes Historical Provider, MD Ko Abdiver strip  11/14/22  Yes Historical Provider, MD   hydroCHLOROthiazide (HYDRODIURIL) 12.5 MG tablet hydrochlorothiazide 12.5 mg tablet   Yes Historical Provider, MD   insulin glargine (BASAGLAR KWIKPEN) 100 UNIT/ML injection pen Basaglar KwikPen U-100 Insulin 100 unit/mL (3 mL) subcutaneous   INJECT 60 UNITS UNDER THE SKIN ONCE DAILY   Yes Historical Provider, MD   NIFEdipine (PROCARDIA XL) 90 MG extended release tablet nifedipine ER 90 mg tablet,extended release 24 hr   TAKE 1 TABLET BY MOUTH EVERY DAY FOR 90 DAYS   Yes Historical Provider, MD   dapagliflozin (FARXIGA) 10 MG tablet Farxiga 10 mg tablet   TAKE 1 TABLET BY MOUTH EVERY DAY   Yes Historical Provider, MD   SITagliptin (JANUVIA) 100 MG tablet Take 100 mg by mouth daily   Yes Historical Provider, MD   hydrALAZINE (APRESOLINE) 100 MG tablet Take 100 mg by mouth 2 times daily   Yes Historical Provider, MD   lisinopril (PRINIVIL;ZESTRIL) 5 MG tablet Take 5 mg by mouth daily   Yes Historical Provider, MD   ibuprofen (ADVIL;MOTRIN) 800 MG tablet Take 1 tablet by mouth every 8 hours as needed for Pain 3/28/17  Yes Chris Beyer PA-C   rosuvastatin (CRESTOR) 10 MG tablet Take 10 mg by mouth daily. Yes Historical Provider, MD   amoxicillin-clavulanate (AUGMENTIN) 875-125 MG per tablet  9/28/22   Historical Provider, MD   amLODIPine (NORVASC) 10 MG tablet Take 10 mg by mouth daily  Patient not taking: No sig reported    Historical Provider, MD   metoprolol succinate (TOPROL XL) 25 MG extended release tablet Take 1 tablet by mouth daily  Patient not taking: No sig reported 9/29/17   Lucia Cockayne, APRN - CNP   amLODIPine (NORVASC) 10 MG tablet Take 1 tablet by mouth daily  Patient not taking: No sig reported 9/29/17   Lucia Cockayne, APRN - CNP   lisinopril-hydrochlorothiazide (PRINZIDE;ZESTORETIC) 20-12.5 MG per tablet Take 1 tablet by mouth 2 times daily  Patient not taking: No sig reported 9/29/17   Lucia Cockayne, APRN - CNP   sitaGLIPtan-metFORMIN (JANUMET)  MG per tablet Take 1 tablet by mouth 2 times daily (with meals). Patient not taking: No sig reported    Historical Provider, MD       No past surgical history on file. No family history on file.     Social History     Tobacco Use    Smoking status: Never    Smokeless tobacco: Never   Substance Use Topics    Alcohol use: Yes     Comment: ocassional       Review of Systems    Review of Systems:   History obtained from chart review and the patient  General ROS: negative for - chills, fatigue, fever, night sweats or weight gain  Constitutional: Negative for chills, diaphoresis, fatigue, fever and unexpected weight change. Musculoskeletal: Positive for arthralgias, gait problem and joint swelling. Neurological ROS: negative for - behavioral changes, confusion, headaches or seizures. Negative for weakness and numbness. Dermatological ROS: negative for - mole changes, rash  Cardiovascular: Negative for leg swelling. Gastrointestinal: Negative for constipation, diarrhea, nausea and vomiting. Lower Extremity Physical Examination:   Vitals: There were no vitals filed for this visit. General: AAO x 3 in NAD. Dermatologic Exam:  Ulcer noted to dorsal medial foot with dry eschar. No drainage noted. Musculoskeletal:     1st MPJ ROM decreased, Bilateral.  Muscle strength 5/5, Bilateral.  Pain present upon palpation of left foot along medial foot at the level of navicular. Medial longitudinal arch, Bilateral WNL. Ankle ROM WNL,Bilateral.    Dorsally contracted digits absent digits 1-5 Bilateral.     Vascular: DP and PT pulses palpable 2/4, Bilateral.  CFT <3 seconds, Bilateral.  Hair growth present to the level of the digits, Bilateral.  Edema absent, Bilateral.  Varicosities absent, Bilateral. Erythema absent, Bilateral    Neurological: Sensation intact to light touch to level of digits, Bilateral.  Protective sensation intact 10/10 sites via 5.07/10g [place]-Curt Monofilament, Bilateral.  negative Tinel's, Bilateral.  negative Valleix sign, Bilateral.      Integument: Warm, dry, supple, Bilateral.  Open lesion absent, Bilateral.  Interdigital maceration absent to web spaces 1-4, Bilateral.  Nails are normal in length, thickness and color 1-5 bilateral.  Fissures absent, Bilateral.       MRI ankle foot:  Occult fracture of the navicular with adjacent soft tissue edema. Consider   further evaluation with CT.        Multifocal patchy marrow edema of the midfoot may be reactive, contusions, or due to changes in repetitive mechanical forces. Partial tearing of the anterior talofibular ligament. Low-grade sprain of   the spring ligament. Tendinosis of the posterior tibialis. CT left ankle and foot:    1. Possible subacute age-indeterminate nondisplaced fracture of the central   navicular. Possible nondisplaced fracture of the inferolateral cuneiform. 2. Mild plantar calcaneal spur. Mild distal Achilles enthesopathy. 3. Mild-to-moderate edema in the subcutaneous fat about the left ankle and   foot primarily at the dorsum of the foot. No organized fluid collection. 4. Mild diffuse degenerative changes as detailed above. 5. 9 mm loose body along the anterior tibiotalar joint recess. Asessment: Patient is a 38 y.o. male with:   1. Possible subacute age-indeterminate nondisplaced fracture of the central   navicular. Possible nondisplaced fracture of the inferolateral cuneiform. 2. Mild plantar calcaneal spur. Mild distal Achilles enthesopathy. 3. Mild-to-moderate edema in the subcutaneous fat about the left ankle and   foot primarily at the dorsum of the foot. No organized fluid collection. 4. Mild diffuse degenerative changes as detailed above. 5. 9 mm loose body along the anterior tibiotalar joint recess. Plan: Patient examined and evaluated. Current condition and treatment options discussed in detail. Discussed conservative and surgical options with the patient. Reviewed and discussed MRI and CT left ankle with above findings. At this time surgery is not recommended as there is minimal displacement. Advised patient to remain in CAM boot at all times when walking. Discussed that it is important for patient to do passive ROM at home for left ankle to improve strength of LLE. Advised pt to apply DSD to wound once daily using silvercell and DSD. Monitor for infection. Verbal and written instructions given to patient.  Contact office with any questions/problems/concerns. RTC in 3week(s).     11/16/2022    Electronically signed by Lydia Sahu DPM on 11/28/2022 at 8:17 AM  11/16/2022

## 2022-12-07 ENCOUNTER — OFFICE VISIT (OUTPATIENT)
Dept: PODIATRY | Age: 42
End: 2022-12-07

## 2022-12-07 VITALS — BODY MASS INDEX: 32.33 KG/M2 | HEIGHT: 75 IN | WEIGHT: 260 LBS

## 2022-12-07 DIAGNOSIS — R60.0 EDEMA OF LOWER EXTREMITY: ICD-10-CM

## 2022-12-07 DIAGNOSIS — L03.116 CELLULITIS OF LEFT FOOT: ICD-10-CM

## 2022-12-07 DIAGNOSIS — M79.605 PAIN OF LEFT LOWER EXTREMITY: ICD-10-CM

## 2022-12-07 DIAGNOSIS — S92.255A CLOSED NONDISPLACED FRACTURE OF NAVICULAR BONE OF LEFT FOOT, INITIAL ENCOUNTER: Primary | ICD-10-CM

## 2022-12-07 NOTE — PATIENT INSTRUCTIONS
Schedule a Vaccine  When you qualify to receive the vaccine, call the [facility]) COVID-19 Vaccination Hotline to schedule your appointment or to get additional information about the [facility]) locations which are offering the COVID-19 vaccine. To be 94% effective, it's important that you receive two doses of one of the COVID-19 vaccines. -If you are receiving the Mccartney Peter vaccine, your second shot will be scheduled as close to 21 days after the first shot as possible. -If you are receiving the Moderna vaccine, your second shot will be scheduled as close to 28 days after the first shot as possible. [facility]) COVID-19 Vaccination Hotline: [phone]    Links to [facility]) website and [facility] website:    [email or web address]    [email or web address]

## 2022-12-07 NOTE — PROGRESS NOTES
[address] [facility]  [address] [place] [address]  Dept: [phone]  Dept Fax: [phone]    RETURN PATIENT PROGRESS NOTE  Date of patient's visit: 12/7/2022  Patient's Name:  Lupis Rocha YOB: 1980            Patient Care Team:  Luzmaria Loyola MD as PCP - General  Miguel Moctezuma DPM as Physician (Podiatry)       Lupis Rocha 38 y.o. male that presents for follow-up of   Chief Complaint   Patient presents with    Foot Injury     Left foot     Foot Pain     Left foot      Symptoms began 2 - 3 month(s) ago and are decreased (not as expected). Patient relates pain is Present. Pain is rated 5 out of 10 and is described as constant, moderate, severe. Treatments prior to today's visit include: CAM boot and previous podiatry treatment. Currently denies F/C/N/V. He relates that he cannot be on his feet for extended periods of time without significant pain the the left foot. No Known Allergies    Past Medical History:   Diagnosis Date    Diabetes mellitus ([facility] [address]     Hyperlipidemia     Hypertension        Prior to Admission medications    Medication Sig Start Date End Date Taking?  Authorizing Provider   Lancets (Chaparro Engman PLUS [id number]) [address] [facility]  11/14/22  Yes Historical Provider, MD LAUREANO ULTRAFINE III SHORT PEN 31G X 8 MM MISC USE AS DIRECTED 10/24/22  Yes Historical Provider, MD Rafiq Coulter strip  11/14/22  Yes Historical Provider, MD   amoxicillin-clavulanate (AUGMENTIN) 875-125 MG per tablet  9/28/22  Yes Historical Provider, MD   hydroCHLOROthiazide (HYDRODIURIL) 12.5 MG tablet hydrochlorothiazide 12.5 mg tablet   Yes Historical Provider, MD   insulin glargine (BASAGLAR KWIKPEN) 100 UNIT/ML injection pen Basaglar KwikPen U-100 Insulin 100 unit/mL (3 mL) subcutaneous   INJECT 60 UNITS UNDER THE SKIN ONCE DAILY   Yes Historical Provider, MD   NIFEdipine (PROCARDIA XL) 90 MG extended release tablet nifedipine ER 90 mg tablet,extended release 24 hr   TAKE 1 TABLET BY MOUTH EVERY DAY FOR 90 DAYS   Yes Historical Provider, MD   dapagliflozin (FARXIGA) 10 MG tablet Farxiga 10 mg tablet   TAKE 1 TABLET BY MOUTH EVERY DAY   Yes Historical Provider, MD   SITagliptin (JANUVIA) 100 MG tablet Take 100 mg by mouth daily   Yes Historical Provider, MD   hydrALAZINE (APRESOLINE) 100 MG tablet Take 100 mg by mouth 2 times daily   Yes Historical Provider, MD   lisinopril (PRINIVIL;ZESTRIL) 5 MG tablet Take 5 mg by mouth daily   Yes Historical Provider, MD   amLODIPine (NORVASC) 10 MG tablet Take 10 mg by mouth daily   Yes Historical Provider, MD   metoprolol succinate (TOPROL XL) 25 MG extended release tablet Take 1 tablet by mouth daily 9/29/17  Yes Joshuaond Child, APRN - CNP   amLODIPine (NORVASC) 10 MG tablet Take 1 tablet by mouth daily 9/29/17  Yes Joshuaond Child, APRN - CNP   lisinopril-hydrochlorothiazide (PRINZIDE;ZESTORETIC) 20-12.5 MG per tablet Take 1 tablet by mouth 2 times daily 9/29/17  Yes Joshuaond Child, APRN - CNP   ibuprofen (ADVIL;MOTRIN) 800 MG tablet Take 1 tablet by mouth every 8 hours as needed for Pain 3/28/17  Yes Keily Chaparro PA-C   sitaGLIPtan-metFORMIN (JANUMET)  MG per tablet Take 1 tablet by mouth 2 times daily (with meals)   Yes Historical Provider, MD   rosuvastatin (CRESTOR) 10 MG tablet Take 10 mg by mouth daily. Yes Historical Provider, MD       Review of Systems    Review of Systems:  History obtained from chart review and the patient  General ROS: negative for - chills, fatigue, fever, night sweats or weight gain  Constitutional: Negative for chills, diaphoresis, fatigue, fever and unexpected weight change. Musculoskeletal: Positive for arthralgias, gait problem and joint swelling. Neurological ROS: negative for - behavioral changes, confusion, headaches or seizures. Negative for weakness and numbness.    Dermatological ROS: negative for - mole changes, rash  Cardiovascular: Negative for leg swelling. Gastrointestinal: Negative for constipation, diarrhea, nausea and vomiting. Lower Extremity Physical Examination:     Vitals: There were no vitals filed for this visit. General: AAO x 3 in NAD. Dermatologic Exam:  Ulcer noted to dorsal medial foot with dry eschar. No drainage noted. There is well healed, epithelized tissue. Musculoskeletal:     1st MPJ ROM decreased, Bilateral.  Muscle strength 5/5, Bilateral.  Pain present upon palpation of left foot along medial foot at the level of navicular. +Pain along PT tendon, left. Medial longitudinal arch, Bilateral WNL. Ankle ROM limited, left foot. Guarded ROM to left ankle. Dorsally contracted digits absent digits 1-5 Bilateral.      Vascular: DP and PT pulses palpable 2/4, Bilateral.  CFT <3 seconds, Bilateral.  Hair growth present to the level of the digits, Bilateral.  Edema absent, Bilateral.  Varicosities absent, Bilateral. Erythema absent, Bilateral     Neurological: Sensation intact to light touch to level of digits, Bilateral.  Protective sensation intact 10/10 sites via 5.07/10g [place]-Curt Monofilament, Bilateral.  negative Tinel's, Bilateral.  negative Valleix sign, Bilateral.       Integument: Warm, dry, supple, Bilateral.  Open lesion absent, Bilateral.  Interdigital maceration absent to web spaces 1-4, Bilateral.  Nails are normal in length, thickness and color 1-5 bilateral.  Fissures absent, Bilateral.     Asessment: Patient is a 38 y.o. male with:    Diagnosis Orders   1. Closed nondisplaced fracture of navicular bone of left foot, initial encounter  MRI ANKLE LEFT WO CONTRAST      2. Edema of lower extremity  MRI ANKLE LEFT WO CONTRAST      3. Pain of left lower extremity  MRI ANKLE LEFT WO CONTRAST      4. Cellulitis of left foot              Plan: Patient examined and evaluated. Current condition and treatment options discussed in detail.    Advised pt to get MRI at this time for further evaluation and possible surgical intervention. As we have exhausted conservative treatment including CAM boot, NSAID, icing, elevating, physical therapy. At this time patient cannot return to work as he is in pain and cannot stand for long periods of time. Patient to return to CAM boot as needed. All labs were reviewed and all imagining including the above findings were reviewed PRIOR to the patients arrival and with the patient today. Previous patient encounter was reviewed. Encounters from the patients other medical providers were reviewed and noted. Time was spent educating the patient and their families/caregivers on proper care of the feet and ankles. All the above diagnosis were addressed at todays visit and all questions were answered. A total of 30 minutes was spent with this patients encounter which included charting after the patients visit    . Verbal and written instructions given to patient. Contact office with any questions/problems/concerns. No orders of the defined types were placed in this encounter. No orders of the defined types were placed in this encounter. RTC in 1month(s).     12/7/2022      Electronically signed by Doug Arnold DPM on 12/7/2022 at 9:42 AM  12/7/2022

## 2022-12-29 ENCOUNTER — TELEPHONE (OUTPATIENT)
Dept: ADMINISTRATIVE | Age: 42
End: 2022-12-29

## 2023-01-01 ENCOUNTER — HOSPITAL ENCOUNTER (EMERGENCY)
Age: 43
End: 2023-06-30
Attending: EMERGENCY MEDICINE

## 2023-01-01 DIAGNOSIS — I46.9 CARDIAC ARREST (HCC): Primary | ICD-10-CM

## 2023-01-01 PROCEDURE — 31500 INSERT EMERGENCY AIRWAY: CPT

## 2023-01-01 PROCEDURE — 6360000002 HC RX W HCPCS: Performed by: EMERGENCY MEDICINE

## 2023-01-01 PROCEDURE — 99285 EMERGENCY DEPT VISIT HI MDM: CPT

## 2023-01-01 PROCEDURE — 2500000003 HC RX 250 WO HCPCS: Performed by: EMERGENCY MEDICINE

## 2023-01-01 PROCEDURE — 32551 INSERTION OF CHEST TUBE: CPT

## 2023-01-01 PROCEDURE — 92950 HEART/LUNG RESUSCITATION CPR: CPT

## 2023-01-01 RX ORDER — CALCIUM CHLORIDE 100 MG/ML
INJECTION INTRAVENOUS; INTRAVENTRICULAR DAILY PRN
Status: COMPLETED | OUTPATIENT
Start: 2023-01-01 | End: 2023-01-01

## 2023-01-01 RX ORDER — SODIUM CHLORIDE 0.9 % (FLUSH) 0.9 %
10 SYRINGE (ML) INJECTION ONCE
Status: DISCONTINUED | OUTPATIENT
Start: 2023-01-01 | End: 2023-07-01 | Stop reason: HOSPADM

## 2023-01-01 RX ORDER — EPINEPHRINE IN SOD CHLOR,ISO 1 MG/10 ML
SYRINGE (ML) INTRAVENOUS DAILY PRN
Status: COMPLETED | OUTPATIENT
Start: 2023-01-01 | End: 2023-01-01

## 2023-01-01 RX ADMIN — EPINEPHRINE 1 MG: 0.1 INJECTION INTRACARDIAC; INTRAVENOUS at 17:43

## 2023-01-01 RX ADMIN — EPINEPHRINE 1 MG: 0.1 INJECTION INTRACARDIAC; INTRAVENOUS at 17:38

## 2023-01-01 RX ADMIN — EPINEPHRINE 1 MG: 0.1 INJECTION INTRACARDIAC; INTRAVENOUS at 17:46

## 2023-01-01 RX ADMIN — TENECTEPLASE 50 MG: KIT at 17:42

## 2023-01-01 RX ADMIN — SODIUM BICARBONATE 50 MEQ: 84 INJECTION, SOLUTION INTRAVENOUS at 17:44

## 2023-01-01 RX ADMIN — EPINEPHRINE 1 MG: 0.1 INJECTION INTRACARDIAC; INTRAVENOUS at 17:50

## 2023-01-01 RX ADMIN — EPINEPHRINE 1 MG: 0.1 INJECTION INTRACARDIAC; INTRAVENOUS at 17:55

## 2023-01-01 RX ADMIN — CALCIUM CHLORIDE 1000 MG: 100 INJECTION INTRAVENOUS; INTRAVENTRICULAR at 17:38

## 2023-01-16 ENCOUNTER — OFFICE VISIT (OUTPATIENT)
Dept: PODIATRY | Age: 43
End: 2023-01-16

## 2023-01-16 VITALS — WEIGHT: 260 LBS | HEIGHT: 75 IN | BODY MASS INDEX: 32.33 KG/M2

## 2023-01-16 DIAGNOSIS — M25.572 LEFT ANKLE PAIN, UNSPECIFIED CHRONICITY: Primary | ICD-10-CM

## 2023-01-16 NOTE — PROGRESS NOTES
[address] [facility] PODIATRY [facility]  [address]  [address]  Dept: [phone]  Dept Fax: [phone]    RETURN PATIENT PROGRESS NOTE  Date of patient's visit: 1/16/2023  Patient's Name:  Carl Aleman YOB: 1980            Patient Care Team:  Abby Gonzalez MD as PCP - General Abel Pederson DPM as Physician (Podiatry)  Arti Chaparro RN as Ambulatory Care Manager       Carl Aleman 38 y.o. male that presents for follow-up of   Chief Complaint   Patient presents with    Ankle Pain     Left ankle    X-ray      Left ankle     Symptoms began 3  - 4month(s) ago and are unchanged . Patient relates pain is Present but improved to left foot and ankle. Pain is rated 3 out of 10 and is described as constant, mild, moderate. Treatments prior to today's visit include: previous podiatry treatment and CAM boot. Currently denies F/C/N/V. No Known Allergies    Past Medical History:   Diagnosis Date    Diabetes mellitus ([address]     Hyperlipidemia     Hypertension        Prior to Admission medications    Medication Sig Start Date End Date Taking?  Authorizing Provider   Lancets ([place] PLUS [id number]) [address] [facility]  11/14/22  Yes Historical Provider, MD LAUREANO ULTRAFINE III SHORT PEN 31G X 8 MM MISC USE AS DIRECTED 10/24/22  Yes Historical Provider, MD Greene Haiaide strip  11/14/22  Yes Historical Provider, MD   amoxicillin-clavulanate (AUGMENTIN) 875-125 MG per tablet  9/28/22  Yes Historical Provider, MD   hydroCHLOROthiazide (HYDRODIURIL) 12.5 MG tablet hydrochlorothiazide 12.5 mg tablet   Yes Historical Provider, MD   insulin glargine (BASAGLAR KWIKPEN) 100 UNIT/ML injection pen Basaglar KwikPen U-100 Insulin 100 unit/mL (3 mL) subcutaneous   INJECT 60 UNITS UNDER THE SKIN ONCE DAILY   Yes Historical Provider, MD   NIFEdipine (PROCARDIA XL) 90 MG extended release tablet nifedipine ER 90 mg tablet,extended release 24 hr   TAKE 1 TABLET BY MOUTH EVERY DAY FOR 90 DAYS   Yes Historical Provider, MD   dapagliflozin (FARXIGA) 10 MG tablet Farxiga 10 mg tablet   TAKE 1 TABLET BY MOUTH EVERY DAY   Yes Historical Provider, MD   SITagliptin (JANUVIA) 100 MG tablet Take 100 mg by mouth daily   Yes Historical Provider, MD   hydrALAZINE (APRESOLINE) 100 MG tablet Take 100 mg by mouth 2 times daily   Yes Historical Provider, MD   lisinopril (PRINIVIL;ZESTRIL) 5 MG tablet Take 5 mg by mouth daily   Yes Historical Provider, MD   amLODIPine (NORVASC) 10 MG tablet Take 10 mg by mouth daily   Yes Historical Provider, MD   metoprolol succinate (TOPROL XL) 25 MG extended release tablet Take 1 tablet by mouth daily 9/29/17  Yes Edwin AlstromKAREN - CNP   amLODIPine (NORVASC) 10 MG tablet Take 1 tablet by mouth daily 9/29/17  Yes Alpha AlstromKAREN - CNP   lisinopril-hydrochlorothiazide (PRINZIDE;ZESTORETIC) 20-12.5 MG per tablet Take 1 tablet by mouth 2 times daily 9/29/17  Yes Alpha AlstromKAREN - CNP   ibuprofen (ADVIL;MOTRIN) 800 MG tablet Take 1 tablet by mouth every 8 hours as needed for Pain 3/28/17  Yes Raza Mishra PA-C   sitaGLIPtan-metFORMIN (JANUMET)  MG per tablet Take 1 tablet by mouth 2 times daily (with meals)   Yes Historical Provider, MD   rosuvastatin (CRESTOR) 10 MG tablet Take 10 mg by mouth daily. Yes Historical Provider, MD       Review of Systems    Review of Systems:  History obtained from chart review and the patient  General ROS: negative for - chills, fatigue, fever, night sweats or weight gain  Constitutional: Negative for chills, diaphoresis, fatigue, fever and unexpected weight change. Musculoskeletal: Positive for arthralgias, gait problem and joint swelling. Neurological ROS: negative for - behavioral changes, confusion, headaches or seizures. Negative for weakness and numbness. Dermatological ROS: negative for - mole changes, rash  Cardiovascular: Negative for leg swelling. Gastrointestinal: Negative for constipation, diarrhea, nausea and vomiting. Lower Extremity Physical Examination:     Vitals: There were no vitals filed for this visit. General: AAO x 3 in NAD. Dermatologic Exam:  Skin lesion/ulceration Absent . Skin No rashes or nodules noted. .       Musculoskeletal:     1st MPJ ROM decreased, Bilateral.  Muscle strength 5/5, Bilateral.  Pain present upon palpation of left ankle medially . Medial longitudinal arch, Bilateral WNL. Ankle ROM WNL,Bilateral.    Dorsally contracted digits absent digits 1-5 Bilateral.     Vascular: DP and PT pulses palpable 2/4, Bilateral.  CFT <3 seconds, Bilateral.  Hair growth present to the level of the digits, Bilateral.  Edema absent, Bilateral.  Varicosities absent, Bilateral. Erythema absent, Bilateral    Neurological: Sensation intact to light touch to level of digits, Bilateral.  Protective sensation intact 10/10 sites via 5.07/10g [place]-Curt Monofilament, Bilateral.  negative Tinel's, Bilateral.  negative Valleix sign, Bilateral.      Integument: Warm, dry, supple, Bilateral.  Open lesion absent, Bilateral.  Interdigital maceration absent to web spaces 1-4, Bilateral.  Nails are normal in length, thickness and color 1-5 bilateral.  Fissures absent, Bilateral.     Xrays, 3 views, left ankle:  good anatomical alignment. No noted acute fractures. Asessment: Patient is a 38 y.o. male with:   1. Left ankle pain, unspecified chronicity        Plan: Patient examined and evaluated. Current condition and treatment options discussed in detail. Advised pt to return to normal shoes as tolerated. Recommend MRI if pain worsens or persists. All labs were reviewed and all imagining including the above findings were reviewed PRIOR to the patients arrival and with the patient today. Previous patient encounter was reviewed. Encounters from the patients other medical providers were reviewed and noted.       Time was spent educating the patient and their families/caregivers on proper care of the feet and ankles. All the above diagnosis were addressed at todays visit and all questions were answered. A total of 30 minutes was spent with this patients encounter which included charting after the patients visit    . Verbal and written instructions given to patient. Contact office with any questions/problems/concerns. Orders Placed This Encounter   Procedures    XR ANKLE LEFT (MIN 3 VIEWS)     Order Specific Question:   Reason for exam:     Answer:   left ankle pain     No orders of the defined types were placed in this encounter. RTC in 1month(s).     1/16/2023      Electronically signed by Anne Burgos DPM on 1/16/2023 at 10:53 AM  1/16/2023

## 2023-01-16 NOTE — PATIENT INSTRUCTIONS
Schedule a Vaccine  When you qualify to receive the vaccine, call the [facility]) COVID-19 Vaccination Hotline to schedule your appointment or to get additional information about the [facility]) locations which are offering the COVID-19 vaccine. To be 94% effective, it's important that you receive two doses of one of the COVID-19 vaccines. -If you are receiving the Mccartney Peter vaccine, your second shot will be scheduled as close to 21 days after the first shot as possible. -If you are receiving the Moderna vaccine, your second shot will be scheduled as close to 28 days after the first shot as possible. [facility]) COVID-19 Vaccination Hotline: [phone]    Links to [facility]) website and [facility] website:    [employer]/[email or web address]-[email or web address]-[email or web address]    [email or web address]

## 2023-01-25 ENCOUNTER — HOSPITAL ENCOUNTER (OUTPATIENT)
Dept: MRI IMAGING | Age: 43
Discharge: HOME | End: 2023-01-27
Payer: COMMERCIAL

## 2023-01-25 DIAGNOSIS — M79.605 PAIN OF LEFT LOWER EXTREMITY: ICD-10-CM

## 2023-01-25 DIAGNOSIS — R60.0 EDEMA OF LOWER EXTREMITY: ICD-10-CM

## 2023-01-25 DIAGNOSIS — S92.255A CLOSED NONDISPLACED FRACTURE OF NAVICULAR BONE OF LEFT FOOT, INITIAL ENCOUNTER: ICD-10-CM

## 2023-01-25 PROCEDURE — 73721 MRI JNT OF LWR EXTRE W/O DYE: CPT

## 2023-02-14 ENCOUNTER — OFFICE VISIT (OUTPATIENT)
Dept: PODIATRY | Age: 43
End: 2023-02-14
Payer: COMMERCIAL

## 2023-02-14 VITALS — BODY MASS INDEX: 32.33 KG/M2 | HEIGHT: 75 IN | WEIGHT: 260 LBS

## 2023-02-14 DIAGNOSIS — S90.32XA CONTUSION OF LEFT FOOT, INITIAL ENCOUNTER: ICD-10-CM

## 2023-02-14 DIAGNOSIS — S91.012A LACERATION OF LEFT ANKLE, INITIAL ENCOUNTER: ICD-10-CM

## 2023-02-14 DIAGNOSIS — M25.572 LEFT ANKLE PAIN, UNSPECIFIED CHRONICITY: Primary | ICD-10-CM

## 2023-02-14 DIAGNOSIS — R60.0 EDEMA OF LOWER EXTREMITY: ICD-10-CM

## 2023-02-14 PROCEDURE — 99214 OFFICE O/P EST MOD 30 MIN: CPT | Performed by: PODIATRIST

## 2023-02-14 RX ORDER — METOPROLOL SUCCINATE 100 MG/1
TABLET, EXTENDED RELEASE ORAL
COMMUNITY
Start: 2023-02-11

## 2023-02-14 RX ORDER — NIFEDIPINE 90 MG/1
TABLET, FILM COATED, EXTENDED RELEASE ORAL
COMMUNITY
Start: 2023-01-11

## 2023-02-14 RX ORDER — PREDNISONE 10 MG/1
TABLET ORAL
Qty: 20 TABLET | Refills: 0 | Status: SHIPPED | OUTPATIENT
Start: 2023-02-14

## 2023-02-14 RX ORDER — ACETAMINOPHEN 160 MG
TABLET,DISINTEGRATING ORAL
COMMUNITY
Start: 2023-01-11

## 2023-02-21 NOTE — PROGRESS NOTES
[address] [facility] PODIATRY [facility]  [address]  [address]  Dept: [phone]  Dept Fax: [phone]    RETURN PATIENT PROGRESS NOTE  Date of patient's visit: 2/14/2023  Patient's Name:  Helen Faulkner YOB: 1980            Patient Care Team:  Julio Dimas MD as PCP - General  Jorge Luis Fuller DPM as Physician (Podiatry)  Andrei Gamboa RN as Ambulatory Care Manager       Helen Faulkner 38 y.o. male that presents for follow-up of   Chief Complaint   Patient presents with    Follow-up     Review MRI      Symptoms began 3  - 4month(s) ago and are unchanged . Patient relates pain is Present but improved to left foot and ankle. Pain is rated 3 out of 10 and is described as constant, mild, moderate. Treatments prior to today's visit include: previous podiatry treatment and CAM boot. Pt is here to discuss MRI results  Currently denies F/C/N/V. No Known Allergies    Past Medical History:   Diagnosis Date    Diabetes mellitus ([facility] [address]     Hyperlipidemia     Hypertension        Prior to Admission medications    Medication Sig Start Date End Date Taking?  Authorizing Provider   Cholecalciferol (VITAMIN D3) 50 MCG (2000 UT) CAPS TAKE 1 CAPSULE BY MOUTH EVERY DAY 1/11/23  Yes Historical Provider, MD   NIFEdipine (ADALAT CC) 90 MG extended release tablet TAKE 1 TABLET BY MOUTH EVERY DAY ON EMPTY STOMACH 1/11/23  Yes Historical Provider, MD   metoprolol succinate (TOPROL XL) 100 MG extended release tablet  2/11/23  Yes Historical Provider, MD   predniSONE (DELTASONE) 10 MG tablet Take one PO TID x 3 days Take one PO BID x 3 days Take one PO qDaily x 3 days Take 1/2 PO qdaily x 4 days 2/14/23  Yes William Castillo DPM   Lancets ([address]) [address] [facility]  11/14/22  Yes Historical Provider, MD LAUREANO ULTRAFINE III SHORT PEN 31G X 8 MM MISC USE AS DIRECTED 10/24/22  Yes Historical Provider, MD Jimmy Sahu strip  11/14/22 Yes Historical Provider, MD   amoxicillin-clavulanate (AUGMENTIN) 875-125 MG per tablet  9/28/22  Yes Historical Provider, MD   hydroCHLOROthiazide (HYDRODIURIL) 12.5 MG tablet hydrochlorothiazide 12.5 mg tablet   Yes Historical Provider, MD   insulin glargine (BASAGLAR KWIKPEN) 100 UNIT/ML injection pen Basaglar KwikPen U-100 Insulin 100 unit/mL (3 mL) subcutaneous   INJECT 60 UNITS UNDER THE SKIN ONCE DAILY   Yes Historical Provider, MD   NIFEdipine (PROCARDIA XL) 90 MG extended release tablet nifedipine ER 90 mg tablet,extended release 24 hr   TAKE 1 TABLET BY MOUTH EVERY DAY FOR 90 DAYS   Yes Historical Provider, MD   dapagliflozin (FARXIGA) 10 MG tablet Farxiga 10 mg tablet   TAKE 1 TABLET BY MOUTH EVERY DAY   Yes Historical Provider, MD   SITagliptin (JANUVIA) 100 MG tablet Take 100 mg by mouth daily   Yes Historical Provider, MD   hydrALAZINE (APRESOLINE) 100 MG tablet Take 100 mg by mouth 2 times daily   Yes Historical Provider, MD   lisinopril (PRINIVIL;ZESTRIL) 5 MG tablet Take 5 mg by mouth daily   Yes Historical Provider, MD   amLODIPine (NORVASC) 10 MG tablet Take 10 mg by mouth daily   Yes Historical Provider, MD   amLODIPine (NORVASC) 10 MG tablet Take 1 tablet by mouth daily 9/29/17  Yes KAREN Riggins CNP   lisinopril-hydrochlorothiazide (PRINZIDE;ZESTORETIC) 20-12.5 MG per tablet Take 1 tablet by mouth 2 times daily 9/29/17  Yes KAREN Riggins CNP   ibuprofen (ADVIL;MOTRIN) 800 MG tablet Take 1 tablet by mouth every 8 hours as needed for Pain 3/28/17  Yes Chase Cota PA-C   sitaGLIPtan-metFORMIN (JANUMET)  MG per tablet Take 1 tablet by mouth 2 times daily (with meals)   Yes Historical Provider, MD   rosuvastatin (CRESTOR) 10 MG tablet Take 10 mg by mouth daily.    Yes Historical Provider, MD       Review of Systems    Review of Systems:  History obtained from chart review and the patient  General ROS: negative for - chills, fatigue, fever, night sweats or weight gain  Constitutional: Negative for chills, diaphoresis, fatigue, fever and unexpected weight change. Musculoskeletal: Positive for arthralgias, gait problem and joint swelling. Neurological ROS: negative for - behavioral changes, confusion, headaches or seizures. Negative for weakness and numbness. Dermatological ROS: negative for - mole changes, rash  Cardiovascular: Negative for leg swelling. Gastrointestinal: Negative for constipation, diarrhea, nausea and vomiting. Lower Extremity Physical Examination:     Vitals: There were no vitals filed for this visit. General: AAO x 3 in NAD. Dermatologic Exam:  Skin lesion/ulceration Absent . Skin No rashes or nodules noted. .       Musculoskeletal:     1st MPJ ROM decreased, Bilateral.  Muscle strength 5/5, Bilateral.  Pain present upon palpation of left ankle medially . Medial longitudinal arch, Bilateral WNL. Ankle ROM WNL,Bilateral.    Dorsally contracted digits absent digits 1-5 Bilateral.     Vascular: DP and PT pulses palpable 2/4, Bilateral.  CFT <3 seconds, Bilateral.  Hair growth present to the level of the digits, Bilateral.  Edema absent, Bilateral.  Varicosities absent, Bilateral. Erythema absent, Bilateral    Neurological: Sensation intact to light touch to level of digits, Bilateral.  Protective sensation intact 10/10 sites via 5.07/10g [place]-Curt Monofilament, Bilateral.  negative Tinel's, Bilateral.  negative Valleix sign, Bilateral.      Integument: Warm, dry, supple, Bilateral.  Open lesion absent, Bilateral.  Interdigital maceration absent to web spaces 1-4, Bilateral.  Nails are normal in length, thickness and color 1-5 bilateral.  Fissures absent, Bilateral.     Xrays, 3 views, left ankle:  good anatomical alignment. No noted acute fractures. MRI left ankle:  Impression   1. Remote/subacute mid navicular fracture. 2. Mild diffuse degenerative changes as detailed above.   Probable   degenerative/posttraumatic marrow edema in the navicular and cuneiforms. 3. Small amount of fluid in the posterior tibiotalar and subtalar joint   spaces. Os trigonum. 4. Mild distal Achilles tendinosis. 5. Mild-to-moderate insertional posterior tibialis tendinosis. 6. Evidence of probable remote high-grade partial tearing of the ATFL. Asessment: Patient is a 38 y.o. male with:    Diagnosis Orders   1. Left ankle pain, unspecified chronicity        2. Laceration of left ankle, initial encounter        3. Contusion of left foot, initial encounter        4. Edema of lower extremity              Plan: Patient examined and evaluated. Current condition and treatment options discussed in detail. Advised pt to return to normal shoes as tolerated. Mri reviewd of the left ankle with the patient today. Discussed the liklyhood of permanent nerve damage to the area. Pt is able to try to wear his work boots but it is not going to be very tolerable with the nerve damage  to the top of the foot    Rx provided for tapered steroid to be taken as directed   Pt to try to get in work boots and will reevaluate in 3 weeks. .  Verbal and written instructions given to patient. Contact office with any questions/problems/concerns. All labs were reviewed and all imagining including the above findings were reviewed PRIOR to the patients arrival and with the patient today. Previous patient encounter was reviewed. Encounters from the patients other medical providers were reviewed and noted. Time was spent educating the patient and their families/caregivers on proper care of the feet and ankles. All the above diagnosis were addressed at todays visit and all questions were answered. A total of 35 minutes was spent with this patients encounter which included charting after the patients visit    No orders of the defined types were placed in this encounter.     Orders Placed This Encounter   Medications    predniSONE (Gunjan Kras) 10 MG tablet     Sig: Take one PO TID x 3 days Take one PO BID x 3 days Take one PO qDaily x 3 days Take 1/2 PO qdaily x 4 days     Dispense:  20 tablet     Refill:  0          RTC in 1month(s).     2/14/2023      Electronically signed by Rubi Aguilera DPM on 2/21/2023 at 7:24 AM  2/14/2023

## 2023-03-07 ENCOUNTER — OFFICE VISIT (OUTPATIENT)
Dept: PODIATRY | Age: 43
End: 2023-03-07
Payer: COMMERCIAL

## 2023-03-07 VITALS — BODY MASS INDEX: 32.33 KG/M2 | WEIGHT: 260 LBS | HEIGHT: 75 IN

## 2023-03-07 DIAGNOSIS — S90.32XA CONTUSION OF LEFT FOOT, INITIAL ENCOUNTER: ICD-10-CM

## 2023-03-07 DIAGNOSIS — M25.572 LEFT ANKLE PAIN, UNSPECIFIED CHRONICITY: Primary | ICD-10-CM

## 2023-03-07 DIAGNOSIS — S91.012A LACERATION OF LEFT ANKLE, INITIAL ENCOUNTER: ICD-10-CM

## 2023-03-07 PROCEDURE — 99213 OFFICE O/P EST LOW 20 MIN: CPT | Performed by: PODIATRIST

## 2023-03-07 NOTE — PROGRESS NOTES
[address] [facility] PODIATRY [facility]  [address]  [address]  Dept: [phone]  Dept Fax: [phone]    RETURN PATIENT PROGRESS NOTE  Date of patient's visit: 3/7/2023  Patient's Name:  Benson Johnson YOB: 1980            Patient Care Team:  Gladis Ruth MD as PCP -   Quintin MIKE Terrazas as Physician (Podiatry)  Mayuri Thomson RN as Ambulatory Care Manager       Benson Johnson 38 y.o. male that presents for follow-up of   Chief Complaint   Patient presents with    Ankle Pain     Left Ankle rtc 3 weeks       Symptoms began 3 week(s) ago and are decreased . Patient relates pain is Present. Pain is rated 2 out of 10 and is described as constant, mild. Treatments prior to today's visit include: previous podiatry treatment. Currently denies F/C/N/V. No Known Allergies    Past Medical History:   Diagnosis Date    Diabetes mellitus ([facility] [address]     Hyperlipidemia     Hypertension        Prior to Admission medications    Medication Sig Start Date End Date Taking?  Authorizing Provider   Cholecalciferol (VITAMIN D3) 50 MCG (2000 UT) CAPS TAKE 1 CAPSULE BY MOUTH EVERY DAY 1/11/23  Yes Historical Provider, MD   NIFEdipine (ADALAT CC) 90 MG extended release tablet TAKE 1 TABLET BY MOUTH EVERY DAY ON EMPTY STOMACH 1/11/23  Yes Historical Provider, MD   metoprolol succinate (TOPROL XL) 100 MG extended release tablet  2/11/23  Yes Historical Provider, MD   predniSONE (DELTASONE) 10 MG tablet Take one PO TID x 3 days Take one PO BID x 3 days Take one PO qDaily x 3 days Take 1/2 PO qdaily x 4 days 2/14/23  Yes Venancio Loera DPM   Lancets ([address]) [address] [facility]  11/14/22  Yes Historical Provider, MD LAUREANO ULTRAFINE III SHORT PEN 31G X 8 MM MISC USE AS DIRECTED 10/24/22  Yes Historical Provider, MD [address] strip  11/14/22  Yes Historical Provider, MD   amoxicillin-clavulanate (AUGMENTIN) 875-125 MG per tablet 9/28/22  Yes Historical Provider, MD   hydroCHLOROthiazide (HYDRODIURIL) 12.5 MG tablet hydrochlorothiazide 12.5 mg tablet   Yes Historical Provider, MD   insulin glargine (BASAGLAR KWIKPEN) 100 UNIT/ML injection pen Basaglar KwikPen U-100 Insulin 100 unit/mL (3 mL) subcutaneous   INJECT 60 UNITS UNDER THE SKIN ONCE DAILY   Yes Historical Provider, MD   NIFEdipine (PROCARDIA XL) 90 MG extended release tablet nifedipine ER 90 mg tablet,extended release 24 hr   TAKE 1 TABLET BY MOUTH EVERY DAY FOR 90 DAYS   Yes Historical Provider, MD   dapagliflozin (FARXIGA) 10 MG tablet Farxiga 10 mg tablet   TAKE 1 TABLET BY MOUTH EVERY DAY   Yes Historical Provider, MD   SITagliptin (JANUVIA) 100 MG tablet Take 100 mg by mouth daily   Yes Historical Provider, MD   hydrALAZINE (APRESOLINE) 100 MG tablet Take 100 mg by mouth 2 times daily   Yes Historical Provider, MD   lisinopril (PRINIVIL;ZESTRIL) 5 MG tablet Take 5 mg by mouth daily   Yes Historical Provider, MD   amLODIPine (NORVASC) 10 MG tablet Take 10 mg by mouth daily   Yes Historical Provider, MD   amLODIPine (NORVASC) 10 MG tablet Take 1 tablet by mouth daily 9/29/17  Yes KAREN Canas CNP   lisinopril-hydrochlorothiazide (PRINZIDE;ZESTORETIC) 20-12.5 MG per tablet Take 1 tablet by mouth 2 times daily 9/29/17  Yes KAREN Canas CNP   ibuprofen (ADVIL;MOTRIN) 800 MG tablet Take 1 tablet by mouth every 8 hours as needed for Pain 3/28/17  Yes Chester Francis PA-C   sitaGLIPtan-metFORMIN (JANUMET)  MG per tablet Take 1 tablet by mouth 2 times daily (with meals)   Yes Historical Provider, MD   rosuvastatin (CRESTOR) 10 MG tablet Take 10 mg by mouth daily.    Yes Historical Provider, MD       Review of Systems    Review of Systems:  History obtained from chart review and the patient  General ROS: negative for - chills, fatigue, fever, night sweats or weight gain  Constitutional: Negative for chills, diaphoresis, fatigue, fever and unexpected weight change. Musculoskeletal: Positive for arthralgias, gait problem and joint swelling. Neurological ROS: negative for - behavioral changes, confusion, headaches or seizures. Negative for weakness and numbness. Dermatological ROS: negative for - mole changes, rash  Cardiovascular: Negative for leg swelling. Gastrointestinal: Negative for constipation, diarrhea, nausea and vomiting. Lower Extremity Physical Examination:     Vitals: There were no vitals filed for this visit. General: AAO x 3 in NAD. Dermatologic Exam:  Skin lesion/ulceration Absent . Skin No rashes or nodules noted. .       Musculoskeletal:     1st MPJ ROM decreased, Bilateral.  Muscle strength 5/5, Bilateral.  Pain present upon palpation of left foot dorsal and medial foot with decreased edema . Medial longitudinal arch, Bilateral WNL. Ankle ROM WNL,Bilateral.    Dorsally contracted digits absent digits 1-5 Bilateral.     Vascular: DP and PT pulses palpable 2/4, Bilateral.  CFT <3 seconds, Bilateral.  Hair growth present to the level of the digits, Bilateral.  Edema absent, Bilateral.  Varicosities absent, Bilateral. Erythema absent, Bilateral    Neurological: Sensation intact to light touch to level of digits, Bilateral.  Protective sensation intact 10/10 sites via 5.07/10g [place]-Curt Monofilament, Bilateral.  negative Tinel's, Bilateral.  negative Valleix sign, Bilateral.      Integument: Warm, dry, supple, Bilateral.  Open lesion absent, Bilateral.  Interdigital maceration absent to web spaces 1-4, Bilateral.  Nails are normal in length, thickness and color 1-5 bilateral.  Fissures absent, Bilateral.       Xrays left foot 3 views: no acute fractue or dislcoation. Decreased edema compared to previous x rays     Asessment: Patient is a 38 y.o. male with:    Diagnosis Orders   1. Left ankle pain, unspecified chronicity        2. Contusion of left foot, initial encounter  XR FOOT LEFT (MIN 3 VIEWS)      3. Laceration of left ankle, initial encounter            Plan: Patient examined and evaluated. Current condition and treatment options discussed in detail. Advised pt to his conditon    At this point the x rays are healed and no other issues are seen. Pt does have numbness but is able to put on his boots now withotu pain. Compression stockings should continue. Pad the area well with socks and loose fitting laces . Verbal and written instructions given to patient. Contact office with any questions/problems/concerns. No orders of the defined types were placed in this encounter. No orders of the defined types were placed in this encounter. RTC in PRn  All labs were reviewed and all imagining including the above findings were reviewed PRIOR to the patients arrival and with the patient today. Previous patient encounter was reviewed. Encounters from the patients other medical providers were reviewed and noted. I personally interpreted the imaging and labs and discussed the results with the patient Time was spent educating the patient and their families/caregivers on proper care of the feet and ankles. All the above diagnosis were addressed at todays visit and all questions were answered. A total of 20 minutes was spent with this patients encounter which included charting after the patients visit  .     3/7/2023      Electronically signed by Ellie George DPM on 3/7/2023 at 2:07 PM  3/7/2023

## 2023-03-07 NOTE — PATIENT INSTRUCTIONS
Schedule a Vaccine  When you qualify to receive the vaccine, call the [facility]) COVID-19 Vaccination Hotline to schedule your appointment or to get additional information about the [facility]) locations which are offering the COVID-19 vaccine. To be 94% effective, it's important that you receive two doses of one of the COVID-19 vaccines. -If you are receiving the Mccartney Peter vaccine, your second shot will be scheduled as close to 21 days after the first shot as possible. -If you are receiving the Moderna vaccine, your second shot will be scheduled as close to 28 days after the first shot as possible. [facility]) COVID-19 Vaccination Hotline: [phone]    Links to [facility]) website and [facility] website:    [employer]/[facility]-monitoring-[email or web address]-covid-19/covid-19-vaccine/[email or web address]    [email or web address]